# Patient Record
Sex: MALE | Race: WHITE | NOT HISPANIC OR LATINO | URBAN - METROPOLITAN AREA
[De-identification: names, ages, dates, MRNs, and addresses within clinical notes are randomized per-mention and may not be internally consistent; named-entity substitution may affect disease eponyms.]

---

## 2017-01-05 ENCOUNTER — OUTPATIENT (OUTPATIENT)
Dept: OUTPATIENT SERVICES | Facility: HOSPITAL | Age: 57
LOS: 1 days | Discharge: HOME | End: 2017-01-05

## 2017-01-20 ENCOUNTER — RECORD ABSTRACTING (OUTPATIENT)
Age: 57
End: 2017-01-20

## 2017-01-24 ENCOUNTER — RX RENEWAL (OUTPATIENT)
Age: 57
End: 2017-01-24

## 2017-02-24 ENCOUNTER — APPOINTMENT (OUTPATIENT)
Dept: CARDIOLOGY | Facility: CLINIC | Age: 57
End: 2017-02-24

## 2017-02-24 VITALS
DIASTOLIC BLOOD PRESSURE: 80 MMHG | WEIGHT: 300 LBS | SYSTOLIC BLOOD PRESSURE: 122 MMHG | HEIGHT: 67 IN | HEART RATE: 72 BPM | BODY MASS INDEX: 47.09 KG/M2

## 2017-04-05 ENCOUNTER — APPOINTMENT (OUTPATIENT)
Dept: CARDIOLOGY | Facility: CLINIC | Age: 57
End: 2017-04-05

## 2017-06-16 ENCOUNTER — APPOINTMENT (OUTPATIENT)
Dept: CARDIOLOGY | Facility: CLINIC | Age: 57
End: 2017-06-16

## 2017-06-16 VITALS
BODY MASS INDEX: 47.09 KG/M2 | HEART RATE: 58 BPM | DIASTOLIC BLOOD PRESSURE: 84 MMHG | WEIGHT: 300 LBS | SYSTOLIC BLOOD PRESSURE: 138 MMHG | HEIGHT: 67 IN

## 2017-06-27 DIAGNOSIS — R91.8 OTHER NONSPECIFIC ABNORMAL FINDING OF LUNG FIELD: ICD-10-CM

## 2017-09-25 ENCOUNTER — MEDICATION RENEWAL (OUTPATIENT)
Age: 57
End: 2017-09-25

## 2017-10-11 ENCOUNTER — APPOINTMENT (OUTPATIENT)
Dept: CARDIOLOGY | Facility: CLINIC | Age: 57
End: 2017-10-11

## 2017-10-11 VITALS
WEIGHT: 294 LBS | HEIGHT: 67 IN | DIASTOLIC BLOOD PRESSURE: 84 MMHG | HEART RATE: 68 BPM | SYSTOLIC BLOOD PRESSURE: 142 MMHG | BODY MASS INDEX: 46.15 KG/M2

## 2018-01-19 ENCOUNTER — APPOINTMENT (OUTPATIENT)
Dept: CARDIOLOGY | Facility: CLINIC | Age: 58
End: 2018-01-19

## 2018-02-07 ENCOUNTER — OUTPATIENT (OUTPATIENT)
Dept: OUTPATIENT SERVICES | Facility: HOSPITAL | Age: 58
LOS: 1 days | Discharge: HOME | End: 2018-02-07

## 2018-02-07 DIAGNOSIS — R91.8 OTHER NONSPECIFIC ABNORMAL FINDING OF LUNG FIELD: ICD-10-CM

## 2018-02-09 ENCOUNTER — APPOINTMENT (OUTPATIENT)
Dept: CARDIOLOGY | Facility: CLINIC | Age: 58
End: 2018-02-09

## 2018-02-09 VITALS
HEART RATE: 70 BPM | SYSTOLIC BLOOD PRESSURE: 142 MMHG | BODY MASS INDEX: 45.52 KG/M2 | HEIGHT: 67 IN | WEIGHT: 290 LBS | DIASTOLIC BLOOD PRESSURE: 84 MMHG

## 2018-06-25 ENCOUNTER — APPOINTMENT (OUTPATIENT)
Dept: CARDIOLOGY | Facility: CLINIC | Age: 58
End: 2018-06-25

## 2018-06-25 VITALS
HEIGHT: 67 IN | WEIGHT: 303 LBS | DIASTOLIC BLOOD PRESSURE: 66 MMHG | SYSTOLIC BLOOD PRESSURE: 114 MMHG | BODY MASS INDEX: 47.56 KG/M2 | HEART RATE: 57 BPM

## 2018-09-06 ENCOUNTER — RX RENEWAL (OUTPATIENT)
Age: 58
End: 2018-09-06

## 2018-09-07 ENCOUNTER — OUTPATIENT (OUTPATIENT)
Dept: OUTPATIENT SERVICES | Facility: HOSPITAL | Age: 58
LOS: 1 days | Discharge: HOME | End: 2018-09-07

## 2018-09-07 DIAGNOSIS — R51 HEADACHE: ICD-10-CM

## 2018-09-26 ENCOUNTER — OUTPATIENT (OUTPATIENT)
Dept: OUTPATIENT SERVICES | Facility: HOSPITAL | Age: 58
LOS: 1 days | Discharge: HOME | End: 2018-09-26

## 2018-09-26 DIAGNOSIS — M54.5 LOW BACK PAIN: ICD-10-CM

## 2018-10-05 ENCOUNTER — OUTPATIENT (OUTPATIENT)
Dept: OUTPATIENT SERVICES | Facility: HOSPITAL | Age: 58
LOS: 1 days | Discharge: HOME | End: 2018-10-05

## 2018-10-05 DIAGNOSIS — R91.8 OTHER NONSPECIFIC ABNORMAL FINDING OF LUNG FIELD: ICD-10-CM

## 2018-10-24 ENCOUNTER — APPOINTMENT (OUTPATIENT)
Dept: CARDIOLOGY | Facility: CLINIC | Age: 58
End: 2018-10-24

## 2018-10-24 VITALS
SYSTOLIC BLOOD PRESSURE: 120 MMHG | HEART RATE: 72 BPM | BODY MASS INDEX: 49.44 KG/M2 | HEIGHT: 67 IN | DIASTOLIC BLOOD PRESSURE: 62 MMHG | WEIGHT: 315 LBS

## 2018-11-17 ENCOUNTER — APPOINTMENT (OUTPATIENT)
Dept: CARDIOLOGY | Facility: CLINIC | Age: 58
End: 2018-11-17

## 2019-02-27 ENCOUNTER — APPOINTMENT (OUTPATIENT)
Dept: CARDIOLOGY | Facility: CLINIC | Age: 59
End: 2019-02-27

## 2019-02-27 VITALS
SYSTOLIC BLOOD PRESSURE: 142 MMHG | WEIGHT: 298 LBS | DIASTOLIC BLOOD PRESSURE: 82 MMHG | HEART RATE: 59 BPM | BODY MASS INDEX: 46.77 KG/M2 | HEIGHT: 67 IN

## 2019-02-27 VITALS — SYSTOLIC BLOOD PRESSURE: 110 MMHG | DIASTOLIC BLOOD PRESSURE: 72 MMHG

## 2019-02-27 NOTE — ASSESSMENT
[FreeTextEntry1] : 58 yo male with pmhx and presentation as above \par cad, ccs class 2\par chf, nyha class 2\par 10/18 - nl lv fnx\par repeat labs\par weight reduction and smoking cessation addressed\par long term risks addressed\par diet and act as tolerated\par aggressive risk modif\par rtc 4 months

## 2019-02-27 NOTE — PHYSICAL EXAM
[General Appearance - Well Developed] : well developed [Normal Appearance] : normal appearance [Well Groomed] : well groomed [General Appearance - Well Nourished] : well nourished [No Deformities] : no deformities [General Appearance - In No Acute Distress] : no acute distress [Normal Oral Mucosa] : normal oral mucosa [Normal Jugular Venous A Waves Present] : normal jugular venous A waves present [Normal Jugular Venous V Waves Present] : normal jugular venous V waves present [No Jugular Venous Cyr A Waves] : no jugular venous cyr A waves [Respiration, Rhythm And Depth] : normal respiratory rhythm and effort [Exaggerated Use Of Accessory Muscles For Inspiration] : no accessory muscle use [Auscultation Breath Sounds / Voice Sounds] : lungs were clear to auscultation bilaterally [Heart Rate And Rhythm] : heart rate and rhythm were normal [Heart Sounds] : normal S1 and S2 [Murmurs] : no murmurs present [Abdomen Soft] : soft [Abdomen Tenderness] : non-tender [Abdomen Mass (___ Cm)] : no abdominal mass palpated [Nail Clubbing] : no clubbing of the fingernails [Cyanosis, Localized] : no localized cyanosis [Petechial Hemorrhages (___cm)] : no petechial hemorrhages [] : no ischemic changes [Skin Color & Pigmentation] : normal skin color and pigmentation

## 2019-02-27 NOTE — REVIEW OF SYSTEMS
[Recent Weight Gain (___ Lbs)] : recent [unfilled] ~Ulb weight gain [see HPI] : see HPI [Negative] : Endocrine

## 2019-02-27 NOTE — HISTORY OF PRESENT ILLNESS
[FreeTextEntry1] : 60 yo male with pmhx as below is here for a f/up visit\par hx of cm, chf, cad on med rx\par no major cvs issues, occasional reyes \par lost 20 lbs\par complaint with meds and diet\par ros is otherwise as below\par still smokes

## 2019-07-26 ENCOUNTER — APPOINTMENT (OUTPATIENT)
Dept: CARDIOLOGY | Facility: CLINIC | Age: 59
End: 2019-07-26
Payer: MEDICARE

## 2019-07-26 VITALS
WEIGHT: 315 LBS | BODY MASS INDEX: 49.44 KG/M2 | HEART RATE: 56 BPM | SYSTOLIC BLOOD PRESSURE: 128 MMHG | DIASTOLIC BLOOD PRESSURE: 76 MMHG | HEIGHT: 67 IN

## 2019-07-26 PROCEDURE — 99214 OFFICE O/P EST MOD 30 MIN: CPT

## 2019-07-26 PROCEDURE — 93000 ELECTROCARDIOGRAM COMPLETE: CPT

## 2019-07-26 NOTE — PHYSICAL EXAM
[General Appearance - Well Developed] : well developed [Normal Appearance] : normal appearance [Well Groomed] : well groomed [General Appearance - Well Nourished] : well nourished [No Deformities] : no deformities [General Appearance - In No Acute Distress] : no acute distress [Normal Oral Mucosa] : normal oral mucosa [Normal Jugular Venous V Waves Present] : normal jugular venous V waves present [Normal Jugular Venous A Waves Present] : normal jugular venous A waves present [No Jugular Venous Cyr A Waves] : no jugular venous cyr A waves [Respiration, Rhythm And Depth] : normal respiratory rhythm and effort [Exaggerated Use Of Accessory Muscles For Inspiration] : no accessory muscle use [Heart Rate And Rhythm] : heart rate and rhythm were normal [Auscultation Breath Sounds / Voice Sounds] : lungs were clear to auscultation bilaterally [Heart Sounds] : normal S1 and S2 [Murmurs] : no murmurs present [Abdomen Soft] : soft [Abdomen Tenderness] : non-tender [Abdomen Mass (___ Cm)] : no abdominal mass palpated [Nail Clubbing] : no clubbing of the fingernails [Cyanosis, Localized] : no localized cyanosis [Petechial Hemorrhages (___cm)] : no petechial hemorrhages [] : no ischemic changes [Skin Color & Pigmentation] : normal skin color and pigmentation

## 2019-07-26 NOTE — HISTORY OF PRESENT ILLNESS
[FreeTextEntry1] : 58 yo male with pmhx as below is here for a f/up visit\par hx of cm, chf, cad on med rx\par no major cvs issues, ongoing reyes \par gained 30 lbs\par complaint with meds, not diet\par ros is otherwise as below\par still smokes

## 2019-07-26 NOTE — ASSESSMENT
[FreeTextEntry1] : 58 yo male with pmhx and presentation as above \par cad, ccs class 2\par chf, nyha class 2\par 10/18 - nl lv fnx\par repeat labs reviewed, cont with med rx\par weight reduction and smoking cessation addressed\par long term risks addressed\par diet and act as tolerated\par aggressive risk modif\par rtc 4 months

## 2019-10-18 ENCOUNTER — OUTPATIENT (OUTPATIENT)
Dept: OUTPATIENT SERVICES | Facility: HOSPITAL | Age: 59
LOS: 1 days | Discharge: HOME | End: 2019-10-18
Payer: MEDICARE

## 2019-10-18 DIAGNOSIS — M54.5 LOW BACK PAIN: ICD-10-CM

## 2019-10-18 PROCEDURE — 72148 MRI LUMBAR SPINE W/O DYE: CPT | Mod: 26

## 2019-11-08 ENCOUNTER — APPOINTMENT (OUTPATIENT)
Dept: CARDIOLOGY | Facility: CLINIC | Age: 59
End: 2019-11-08
Payer: MEDICARE

## 2019-11-08 VITALS
DIASTOLIC BLOOD PRESSURE: 76 MMHG | HEART RATE: 67 BPM | HEIGHT: 67 IN | BODY MASS INDEX: 49.44 KG/M2 | WEIGHT: 315 LBS | SYSTOLIC BLOOD PRESSURE: 132 MMHG

## 2019-11-08 PROCEDURE — 93000 ELECTROCARDIOGRAM COMPLETE: CPT

## 2019-11-08 PROCEDURE — 99214 OFFICE O/P EST MOD 30 MIN: CPT

## 2019-11-08 NOTE — HISTORY OF PRESENT ILLNESS
[FreeTextEntry1] : 58 yo male with pmhx as below is here for a f/up visit\par hx of cm, chf, cad on med rx\par no major cvs issues, ongoing reyes \par cont to gain weight\par complaint with meds, not diet\par ros is otherwise as below\par still smokes

## 2019-11-08 NOTE — ASSESSMENT
[FreeTextEntry1] : 60 yo male with pmhx and presentation as above \par cad, ccs class 2\par chf, nyha class 2\par 10/18 - nl lv fnx\par repeat labs reviewed, cont with med rx\par a1c noted, needs to cut down carbs\par weight reduction and smoking cessation addressed\par long term risks addressed\par diet and act as tolerated\par aggressive risk modif\par rtc 4 months

## 2020-03-11 ENCOUNTER — APPOINTMENT (OUTPATIENT)
Dept: CARDIOLOGY | Facility: CLINIC | Age: 60
End: 2020-03-11
Payer: MEDICARE

## 2020-03-11 VITALS — DIASTOLIC BLOOD PRESSURE: 80 MMHG | BODY MASS INDEX: 47.61 KG/M2 | SYSTOLIC BLOOD PRESSURE: 131 MMHG | WEIGHT: 304 LBS

## 2020-03-11 PROCEDURE — 99214 OFFICE O/P EST MOD 30 MIN: CPT

## 2020-03-11 PROCEDURE — 93000 ELECTROCARDIOGRAM COMPLETE: CPT

## 2020-03-11 NOTE — ASSESSMENT
[FreeTextEntry1] : 61 yo male with pmhx and presentation as above \par cad, ccs class 2\par chf, nyha class 2\par 10/18 - nl lv fnx, repeat echo before the next visit\par repeat labs reviewed, cont with med rx\par a1c noted, better\par weight reduction and smoking cessation addressed\par long term risks addressed\par diet and act as tolerated\par aggressive risk modif\par rtc 4 months

## 2020-03-11 NOTE — PHYSICAL EXAM
[General Appearance - Well Developed] : well developed [Normal Appearance] : normal appearance [Well Groomed] : well groomed [General Appearance - Well Nourished] : well nourished [No Deformities] : no deformities [General Appearance - In No Acute Distress] : no acute distress [Normal Oral Mucosa] : normal oral mucosa [Normal Jugular Venous A Waves Present] : normal jugular venous A waves present [Normal Jugular Venous V Waves Present] : normal jugular venous V waves present [No Jugular Venous Cyr A Waves] : no jugular venous cyr A waves [Respiration, Rhythm And Depth] : normal respiratory rhythm and effort [Exaggerated Use Of Accessory Muscles For Inspiration] : no accessory muscle use [Auscultation Breath Sounds / Voice Sounds] : lungs were clear to auscultation bilaterally [Heart Rate And Rhythm] : heart rate and rhythm were normal [Heart Sounds] : normal S1 and S2 [Murmurs] : no murmurs present [Abdomen Soft] : soft [Abdomen Tenderness] : non-tender [Abdomen Mass (___ Cm)] : no abdominal mass palpated [Nail Clubbing] : no clubbing of the fingernails [Cyanosis, Localized] : no localized cyanosis [Petechial Hemorrhages (___cm)] : no petechial hemorrhages [] : no ischemic changes [Skin Color & Pigmentation] : normal skin color and pigmentation [Oriented To Time, Place, And Person] : oriented to person, place, and time

## 2020-03-11 NOTE — HISTORY OF PRESENT ILLNESS
[FreeTextEntry1] : 61 yo male with pmhx as below is here for a f/up visit\par hx of cm, chf, cad on med rx\par no major cvs issues, ongoing reyes \par lost 30 lbs\par complaint with meds and diet\par ros is otherwise as below\par still smokes

## 2020-07-01 ENCOUNTER — APPOINTMENT (OUTPATIENT)
Dept: CARDIOLOGY | Facility: CLINIC | Age: 60
End: 2020-07-01

## 2020-07-15 ENCOUNTER — APPOINTMENT (OUTPATIENT)
Dept: CARDIOLOGY | Facility: CLINIC | Age: 60
End: 2020-07-15

## 2020-12-19 ENCOUNTER — EMERGENCY (EMERGENCY)
Facility: HOSPITAL | Age: 60
LOS: 0 days | Discharge: LEFT BEFORE DISCHARGE | End: 2020-12-19
Attending: EMERGENCY MEDICINE | Admitting: EMERGENCY MEDICINE
Payer: MEDICARE

## 2020-12-19 VITALS
HEART RATE: 88 BPM | DIASTOLIC BLOOD PRESSURE: 70 MMHG | OXYGEN SATURATION: 99 % | TEMPERATURE: 98 F | WEIGHT: 300.05 LBS | HEIGHT: 68 IN | SYSTOLIC BLOOD PRESSURE: 131 MMHG | RESPIRATION RATE: 18 BRPM

## 2020-12-19 VITALS
RESPIRATION RATE: 18 BRPM | DIASTOLIC BLOOD PRESSURE: 63 MMHG | OXYGEN SATURATION: 99 % | SYSTOLIC BLOOD PRESSURE: 164 MMHG | HEART RATE: 64 BPM

## 2020-12-19 DIAGNOSIS — F03.90 UNSPECIFIED DEMENTIA, UNSPECIFIED SEVERITY, WITHOUT BEHAVIORAL DISTURBANCE, PSYCHOTIC DISTURBANCE, MOOD DISTURBANCE, AND ANXIETY: ICD-10-CM

## 2020-12-19 DIAGNOSIS — R55 SYNCOPE AND COLLAPSE: ICD-10-CM

## 2020-12-19 DIAGNOSIS — F17.210 NICOTINE DEPENDENCE, CIGARETTES, UNCOMPLICATED: ICD-10-CM

## 2020-12-19 DIAGNOSIS — E66.9 OBESITY, UNSPECIFIED: ICD-10-CM

## 2020-12-19 DIAGNOSIS — Z20.828 CONTACT WITH AND (SUSPECTED) EXPOSURE TO OTHER VIRAL COMMUNICABLE DISEASES: ICD-10-CM

## 2020-12-19 DIAGNOSIS — R41.82 ALTERED MENTAL STATUS, UNSPECIFIED: ICD-10-CM

## 2020-12-19 DIAGNOSIS — F43.9 REACTION TO SEVERE STRESS, UNSPECIFIED: ICD-10-CM

## 2020-12-19 LAB
ALBUMIN SERPL ELPH-MCNC: 4.5 G/DL — SIGNIFICANT CHANGE UP (ref 3.5–5.2)
ALP SERPL-CCNC: 73 U/L — SIGNIFICANT CHANGE UP (ref 30–115)
ALT FLD-CCNC: 24 U/L — SIGNIFICANT CHANGE UP (ref 0–41)
ANION GAP SERPL CALC-SCNC: 10 MMOL/L — SIGNIFICANT CHANGE UP (ref 7–14)
APTT BLD: 37.7 SEC — SIGNIFICANT CHANGE UP (ref 27–39.2)
AST SERPL-CCNC: 35 U/L — SIGNIFICANT CHANGE UP (ref 0–41)
BASOPHILS # BLD AUTO: 0.05 K/UL — SIGNIFICANT CHANGE UP (ref 0–0.2)
BASOPHILS NFR BLD AUTO: 0.5 % — SIGNIFICANT CHANGE UP (ref 0–1)
BILIRUB SERPL-MCNC: 0.3 MG/DL — SIGNIFICANT CHANGE UP (ref 0.2–1.2)
BUN SERPL-MCNC: 16 MG/DL — SIGNIFICANT CHANGE UP (ref 10–20)
CALCIUM SERPL-MCNC: 9.3 MG/DL — SIGNIFICANT CHANGE UP (ref 8.5–10.1)
CHLORIDE SERPL-SCNC: 104 MMOL/L — SIGNIFICANT CHANGE UP (ref 98–110)
CO2 SERPL-SCNC: 24 MMOL/L — SIGNIFICANT CHANGE UP (ref 17–32)
CREAT SERPL-MCNC: 1.1 MG/DL — SIGNIFICANT CHANGE UP (ref 0.7–1.5)
EOSINOPHIL # BLD AUTO: 0.11 K/UL — SIGNIFICANT CHANGE UP (ref 0–0.7)
EOSINOPHIL NFR BLD AUTO: 1.1 % — SIGNIFICANT CHANGE UP (ref 0–8)
GLUCOSE SERPL-MCNC: 97 MG/DL — SIGNIFICANT CHANGE UP (ref 70–99)
HCT VFR BLD CALC: 47.6 % — SIGNIFICANT CHANGE UP (ref 42–52)
HGB BLD-MCNC: 15.4 G/DL — SIGNIFICANT CHANGE UP (ref 14–18)
IMM GRANULOCYTES NFR BLD AUTO: 0.4 % — HIGH (ref 0.1–0.3)
INR BLD: 1.03 RATIO — SIGNIFICANT CHANGE UP (ref 0.65–1.3)
LYMPHOCYTES # BLD AUTO: 3.36 K/UL — SIGNIFICANT CHANGE UP (ref 1.2–3.4)
LYMPHOCYTES # BLD AUTO: 33.3 % — SIGNIFICANT CHANGE UP (ref 20.5–51.1)
MCHC RBC-ENTMCNC: 31.1 PG — HIGH (ref 27–31)
MCHC RBC-ENTMCNC: 32.4 G/DL — SIGNIFICANT CHANGE UP (ref 32–37)
MCV RBC AUTO: 96.2 FL — HIGH (ref 80–94)
MONOCYTES # BLD AUTO: 0.6 K/UL — SIGNIFICANT CHANGE UP (ref 0.1–0.6)
MONOCYTES NFR BLD AUTO: 6 % — SIGNIFICANT CHANGE UP (ref 1.7–9.3)
NEUTROPHILS # BLD AUTO: 5.92 K/UL — SIGNIFICANT CHANGE UP (ref 1.4–6.5)
NEUTROPHILS NFR BLD AUTO: 58.7 % — SIGNIFICANT CHANGE UP (ref 42.2–75.2)
NRBC # BLD: 0 /100 WBCS — SIGNIFICANT CHANGE UP (ref 0–0)
PLATELET # BLD AUTO: 220 K/UL — SIGNIFICANT CHANGE UP (ref 130–400)
POTASSIUM SERPL-MCNC: 4.6 MMOL/L — SIGNIFICANT CHANGE UP (ref 3.5–5)
POTASSIUM SERPL-SCNC: 4.6 MMOL/L — SIGNIFICANT CHANGE UP (ref 3.5–5)
PROT SERPL-MCNC: 6.9 G/DL — SIGNIFICANT CHANGE UP (ref 6–8)
PROTHROM AB SERPL-ACNC: 11.8 SEC — SIGNIFICANT CHANGE UP (ref 9.95–12.87)
RAPID RVP RESULT: SIGNIFICANT CHANGE UP
RBC # BLD: 4.95 M/UL — SIGNIFICANT CHANGE UP (ref 4.7–6.1)
RBC # FLD: 14.5 % — SIGNIFICANT CHANGE UP (ref 11.5–14.5)
SARS-COV-2 RNA SPEC QL NAA+PROBE: SIGNIFICANT CHANGE UP
SODIUM SERPL-SCNC: 138 MMOL/L — SIGNIFICANT CHANGE UP (ref 135–146)
TROPONIN T SERPL-MCNC: <0.01 NG/ML — SIGNIFICANT CHANGE UP
WBC # BLD: 10.08 K/UL — SIGNIFICANT CHANGE UP (ref 4.8–10.8)
WBC # FLD AUTO: 10.08 K/UL — SIGNIFICANT CHANGE UP (ref 4.8–10.8)

## 2020-12-19 PROCEDURE — 70498 CT ANGIOGRAPHY NECK: CPT | Mod: 26

## 2020-12-19 PROCEDURE — 70450 CT HEAD/BRAIN W/O DYE: CPT | Mod: 26,59

## 2020-12-19 PROCEDURE — 70496 CT ANGIOGRAPHY HEAD: CPT | Mod: 26

## 2020-12-19 PROCEDURE — 99285 EMERGENCY DEPT VISIT HI MDM: CPT | Mod: CS

## 2020-12-19 NOTE — ED PROVIDER NOTE - OBJECTIVE STATEMENT
Patient is a 60 years old M pmhx sleep apnea on cpap, obesity presents to the ED to bring mom for evaluation and upon being in triage patient felt weak and was near syncopal and code stroke called.  As per patient brought mom in for placement as she has been progressively worsening with dementia.  Pt sts under great stress due to moving day tomorrow.

## 2020-12-19 NOTE — ED ADULT TRIAGE NOTE - CHIEF COMPLAINT QUOTE
Pt was in the waiting room to drop off his mother. Pt had a period of confusion, pt was not answering questions, not speaking. Incident happened at 16:41

## 2020-12-19 NOTE — ED PROVIDER NOTE - ATTENDING CONTRIBUTION TO CARE
Pt was assisting his elderly mother to the ER. Pt states he is under severe stress, + tob. Pt did not eat today. Only had coffee. While standing in the waiting area suddenly pt became unresponsive while standing. No fall. Pt was leaning on entry area. Nursing called and found pt not responding though standing and leaning. Placed on stretcher. pt then woke up and appeared slightly confused. When I was called in 15 seconds pt was awake alert and oriented. Pt states he is moving and very busy. No headache, no chest pain, no back pain, no weakness or numbness anywhere. Neuro intact. S1S2 rrr, lungs clear,

## 2020-12-19 NOTE — ED PROVIDER NOTE - PATIENT PORTAL LINK FT
You can access the FollowMyHealth Patient Portal offered by [facility] by registering at the following website: [email or web address]. By joining [facility]’s FollowMyHealth portal, you will also be able to view your health information using other applications (apps) compatible with our system.

## 2020-12-19 NOTE — ED PROVIDER NOTE - PROGRESS NOTE DETAILS
Dr. Gray spoke with neurologist Dr. Galdamez The patient wishes to leave against medical advice.  I have discussed the risks, benefits and alternatives (including the possibility of worsening of disease, pain, permanent disability, and/or death) with the patient and his/her family (if available).  The patient voices understanding of these risks, benefits, and alternatives and still wishes to sign out against medical advice.  The patient is awake, alert, oriented  x 3 and has demonstrated capacity to refuse/direct care.  I have advised the patient that they can and should return immediately should they develop any worse/different/additional symptoms, or if they change their mind and want to continue their care.  As per patient is moving tomorrow and cannot stay, patient is aware to return at any time for continued care.  Pt sts he cannot take mom home who is also a patient in the ED as he notes he no longer can take care of patient and she needs to be placed in a home.

## 2020-12-19 NOTE — ED PROVIDER NOTE - CLINICAL SUMMARY MEDICAL DECISION MAKING FREE TEXT BOX
Pt with episode of unresponsiveness while standing. Did not fall. Safely placed on stretcher. Pt without complaints. Stroke code called. NIH 0. Spoke to neuro attending on call-Dr. eSbastian jones>>admission and EEG. Pt refused to stay as he is moving tomorrow.

## 2020-12-19 NOTE — ED PROVIDER NOTE - PHYSICAL EXAMINATION
Gen: Alert, NAD  Head: NC, AT, PERRL, EOMI  ENT: normal hearing, patent oropharynx without erythema/exudate, uvula midline  Neck: +supple, no tenderness  Pulm: Bilateral BS, normal resp effort  CV: RRR  Abd: soft, NT/ND  Skin: no rash  Neuro: AAOx3

## 2020-12-28 ENCOUNTER — EMERGENCY (EMERGENCY)
Facility: HOSPITAL | Age: 60
LOS: 0 days | Discharge: HOME | End: 2020-12-28
Attending: EMERGENCY MEDICINE | Admitting: EMERGENCY MEDICINE
Payer: COMMERCIAL

## 2020-12-28 VITALS
OXYGEN SATURATION: 99 % | HEART RATE: 108 BPM | SYSTOLIC BLOOD PRESSURE: 190 MMHG | RESPIRATION RATE: 18 BRPM | HEIGHT: 68 IN | WEIGHT: 315 LBS | TEMPERATURE: 98 F | DIASTOLIC BLOOD PRESSURE: 107 MMHG
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
## 2020-12-28 DIAGNOSIS — Z20.828 CONTACT WITH AND (SUSPECTED) EXPOSURE TO OTHER VIRAL COMMUNICABLE DISEASES: ICD-10-CM
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
## 2020-12-28 DIAGNOSIS — R55 SYNCOPE AND COLLAPSE: ICD-10-CM

## 2020-12-28 DIAGNOSIS — F17.210 NICOTINE DEPENDENCE, CIGARETTES, UNCOMPLICATED: ICD-10-CM

## 2020-12-28 LAB
ALBUMIN SERPL ELPH-MCNC: 4.4 G/DL — SIGNIFICANT CHANGE UP (ref 3.5–5.2)
ALP SERPL-CCNC: 69 U/L — SIGNIFICANT CHANGE UP (ref 30–115)
ALT FLD-CCNC: 38 U/L — SIGNIFICANT CHANGE UP (ref 0–41)
ANION GAP SERPL CALC-SCNC: 10 MMOL/L — SIGNIFICANT CHANGE UP (ref 7–14)
AST SERPL-CCNC: 31 U/L — SIGNIFICANT CHANGE UP (ref 0–41)
BASOPHILS # BLD AUTO: 0.06 K/UL — SIGNIFICANT CHANGE UP (ref 0–0.2)
BASOPHILS NFR BLD AUTO: 0.6 % — SIGNIFICANT CHANGE UP (ref 0–1)
BILIRUB SERPL-MCNC: 0.3 MG/DL — SIGNIFICANT CHANGE UP (ref 0.2–1.2)
BUN SERPL-MCNC: 15 MG/DL — SIGNIFICANT CHANGE UP (ref 10–20)
CALCIUM SERPL-MCNC: 9.3 MG/DL — SIGNIFICANT CHANGE UP (ref 8.5–10.1)
CHLORIDE SERPL-SCNC: 104 MMOL/L — SIGNIFICANT CHANGE UP (ref 98–110)
CO2 SERPL-SCNC: 25 MMOL/L — SIGNIFICANT CHANGE UP (ref 17–32)
CREAT SERPL-MCNC: 1 MG/DL — SIGNIFICANT CHANGE UP (ref 0.7–1.5)
EOSINOPHIL # BLD AUTO: 0.04 K/UL — SIGNIFICANT CHANGE UP (ref 0–0.7)
EOSINOPHIL NFR BLD AUTO: 0.4 % — SIGNIFICANT CHANGE UP (ref 0–8)
GLUCOSE SERPL-MCNC: 114 MG/DL — HIGH (ref 70–99)
HCT VFR BLD CALC: 47.5 % — SIGNIFICANT CHANGE UP (ref 42–52)
HGB BLD-MCNC: 15.3 G/DL — SIGNIFICANT CHANGE UP (ref 14–18)
IMM GRANULOCYTES NFR BLD AUTO: 0.7 % — HIGH (ref 0.1–0.3)
LYMPHOCYTES # BLD AUTO: 1.68 K/UL — SIGNIFICANT CHANGE UP (ref 1.2–3.4)
LYMPHOCYTES # BLD AUTO: 15.9 % — LOW (ref 20.5–51.1)
MAGNESIUM SERPL-MCNC: 2.1 MG/DL — SIGNIFICANT CHANGE UP (ref 1.8–2.4)
MCHC RBC-ENTMCNC: 31.4 PG — HIGH (ref 27–31)
MCHC RBC-ENTMCNC: 32.2 G/DL — SIGNIFICANT CHANGE UP (ref 32–37)
MCV RBC AUTO: 97.3 FL — HIGH (ref 80–94)
MONOCYTES # BLD AUTO: 0.5 K/UL — SIGNIFICANT CHANGE UP (ref 0.1–0.6)
MONOCYTES NFR BLD AUTO: 4.7 % — SIGNIFICANT CHANGE UP (ref 1.7–9.3)
NEUTROPHILS # BLD AUTO: 8.22 K/UL — HIGH (ref 1.4–6.5)
NEUTROPHILS NFR BLD AUTO: 77.7 % — HIGH (ref 42.2–75.2)
NRBC # BLD: 0 /100 WBCS — SIGNIFICANT CHANGE UP (ref 0–0)
NT-PROBNP SERPL-SCNC: 43 PG/ML — SIGNIFICANT CHANGE UP (ref 0–300)
PLATELET # BLD AUTO: 217 K/UL — SIGNIFICANT CHANGE UP (ref 130–400)
POTASSIUM SERPL-MCNC: 4.5 MMOL/L — SIGNIFICANT CHANGE UP (ref 3.5–5)
POTASSIUM SERPL-SCNC: 4.5 MMOL/L — SIGNIFICANT CHANGE UP (ref 3.5–5)
PROT SERPL-MCNC: 6.6 G/DL — SIGNIFICANT CHANGE UP (ref 6–8)
RBC # BLD: 4.88 M/UL — SIGNIFICANT CHANGE UP (ref 4.7–6.1)
RBC # FLD: 14.5 % — SIGNIFICANT CHANGE UP (ref 11.5–14.5)
SARS-COV-2 RNA SPEC QL NAA+PROBE: SIGNIFICANT CHANGE UP
SODIUM SERPL-SCNC: 139 MMOL/L — SIGNIFICANT CHANGE UP (ref 135–146)
TROPONIN T SERPL-MCNC: <0.01 NG/ML — SIGNIFICANT CHANGE UP
WBC # BLD: 10.57 K/UL — SIGNIFICANT CHANGE UP (ref 4.8–10.8)
WBC # FLD AUTO: 10.57 K/UL — SIGNIFICANT CHANGE UP (ref 4.8–10.8)

## 2020-12-28 PROCEDURE — 99285 EMERGENCY DEPT VISIT HI MDM: CPT

## 2020-12-28 PROCEDURE — 71045 X-RAY EXAM CHEST 1 VIEW: CPT | Mod: 26

## 2020-12-28 PROCEDURE — 93010 ELECTROCARDIOGRAM REPORT: CPT

## 2020-12-28 RX ORDER — SODIUM CHLORIDE 9 MG/ML
1000 INJECTION, SOLUTION INTRAVENOUS ONCE
Refills: 0 | Status: COMPLETED | OUTPATIENT
Start: 2020-12-28 | End: 2020-12-28

## 2020-12-28 RX ADMIN — SODIUM CHLORIDE 1000 MILLILITER(S): 9 INJECTION, SOLUTION INTRAVENOUS at 20:18
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
## 2020-12-28 NOTE — ED PROVIDER NOTE - CARE PROVIDERS DIRECT ADDRESSES
[email or web address]

## 2020-12-28 NOTE — ED PROVIDER NOTE - PATIENT PORTAL LINK FT
You can access the FollowMyHealth Patient Portal offered by [facility] by registering at the following website: [email or web address]. By joining [facility]’s FollowMyHealth portal, you will also be able to view your health information using other applications (apps) compatible with our system.

## 2020-12-28 NOTE — ED PROVIDER NOTE - PHYSICAL EXAMINATION
CONSTITUTIONAL: Well-developed; well-nourished; in no acute distress. Non toxic appearing,   SKIN: warm, dry, no acute rash, abrasions, lacerations or hematomas.  HEAD: Normocephalic; no skull indentations, no contusions or lacerations. no battles sign or raccoon eyes.   EENT:no gross trauma bilaterally, no proptosis conjunctiva and sclera clear. No entrapment.  MM moist, no nasal discharge.  No septal hematoma. Dentition intact. Pharynx unremarkable. TM's unremarkable, no bulging, normal light reflex, no hemotympanum.   NECK: Supple, no midline tenderness, normal ROM, no midline ttp or stepoffs  BACK: nttp no midline ttp or stepoffs.  CHEST: No bruising, sub cutaneous emphysema, or crepitus, nttp.  CARD: S1, S2 normal; no murmurs, gallops, or rubs. Regular rate and rhythm.   RESP: No wheezes, rales or rhonchi.  ABD: soft ntnd no seatbelt sign  BACK: no midline tenderness or step offs  PELVIS: no laxity with lateral compression  EXT: no gross extremity injury  NEURO: gcs 15, moving all extremities grossly, following commands  PSYCH: Cooperative, appropriate
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
## 2020-12-28 NOTE — ED ADULT TRIAGE NOTE - CADM TRG TX PRIOR TO ARRIVAL
Pt and mother updated on plan of care. Pt states that she would to go home. Mother states that she had Motrin at 3:15 pm and Tylenol at noon.      Celso Pa RN  02/22/20 5986 [facility]  02/22/20 5412 collar cleared md aguero/KATHY-pa

## 2020-12-28 NOTE — ED ADULT NURSE NOTE - EXPLANATION OF PATIENT'S REASON FOR LEAVING
Pt. states that he care for mother with dementia at home. He states that he will be following up with cardiology tomorrow
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
## 2020-12-28 NOTE — ED PROVIDER NOTE - NS ED ROS FT

## 2020-12-28 NOTE — ED ADULT NURSE NOTE - NS ED NURSE RECORD ANOTHER VITAL SIGN
Anesthesia Evaluation     no history of anesthetic complications:  NPO Solid Status: > 8 hours  NPO Liquid Status: > 8 hours           Airway   Mallampati: II  TM distance: >3 FB  Neck ROM: full  Dental    (+) lower dentures and upper dentures    Pulmonary     breath sounds clear to auscultation  (+) a smoker (1 PPD) Current Smoked day of surgery, COPD, decreased breath sounds,     ROS comment: cough  Cardiovascular   Exercise tolerance: good (4-7 METS)    Rhythm: regular  Rate: normal    (+) hypertension, murmur, PVD, hyperlipidemia  (-) past MI, angina    ROS comment: EF 50-55%    Neuro/Psych- negative ROS  GI/Hepatic/Renal/Endo - negative ROS     Musculoskeletal     (+) back pain,   Abdominal  - normal exam   Substance History - negative use     OB/GYN          Other   (+) arthritis     (-) history of cancer                Anesthesia Plan    ASA 3     MAC     intravenous induction   Anesthetic plan and risks discussed with patient.       Yes

## 2020-12-28 NOTE — ED PROVIDER NOTE - CARE PROVIDER_API CALL
Clay Aldridge  CARDIOVASCULAR DISEASE  [address] [place] [address], [place] [address]  Phone: [phone]  Fax: [phone]  Follow Up Time: 4-6 Days

## 2021-01-05 ENCOUNTER — APPOINTMENT (OUTPATIENT)
Dept: CARDIOLOGY | Facility: CLINIC | Age: 61
End: 2021-01-05
Payer: MEDICARE

## 2021-01-05 VITALS
SYSTOLIC BLOOD PRESSURE: 130 MMHG | TEMPERATURE: 97.3 F | DIASTOLIC BLOOD PRESSURE: 80 MMHG | BODY MASS INDEX: 47.09 KG/M2 | RESPIRATION RATE: 18 BRPM | OXYGEN SATURATION: 98 % | HEART RATE: 60 BPM | HEIGHT: 67 IN | WEIGHT: 300 LBS

## 2021-01-05 PROCEDURE — 93000 ELECTROCARDIOGRAM COMPLETE: CPT

## 2021-01-05 PROCEDURE — 99214 OFFICE O/P EST MOD 30 MIN: CPT | Mod: 25

## 2021-01-05 PROCEDURE — 93306 TTE W/DOPPLER COMPLETE: CPT

## 2021-01-05 NOTE — ASSESSMENT
[FreeTextEntry1] : 61 yo male with pmhx and presentation as above \par cad, ccs class 2\par chf, nyha class 2\par recurrent syncope\par 2d ehco today\par ep referral given\par refrain from driving until w/up completed\par ample hydration, stress coping mechanism discussed\par weight reduction and smoking cessation addressed\par long term risks addressed\par diet and act as tolerated\par aggressive risk modif\par rtc 4-6 weeks
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
## 2021-01-12 ENCOUNTER — APPOINTMENT (OUTPATIENT)
Dept: CARDIOLOGY | Facility: CLINIC | Age: 61
End: 2021-01-12
Payer: MEDICARE

## 2021-01-12 VITALS
TEMPERATURE: 97.3 F | BODY MASS INDEX: 47.09 KG/M2 | SYSTOLIC BLOOD PRESSURE: 138 MMHG | DIASTOLIC BLOOD PRESSURE: 88 MMHG | HEIGHT: 67 IN | WEIGHT: 300 LBS | HEART RATE: 61 BPM

## 2021-01-12 PROCEDURE — 99204 OFFICE O/P NEW MOD 45 MIN: CPT

## 2021-01-12 PROCEDURE — 93000 ELECTROCARDIOGRAM COMPLETE: CPT

## 2021-01-12 NOTE — PHYSICAL EXAM
[General Appearance - Well Developed] : well developed [Normal Appearance] : normal appearance [Well Groomed] : well groomed [General Appearance - Well Nourished] : well nourished [No Deformities] : no deformities [General Appearance - In No Acute Distress] : no acute distress [Normal Conjunctiva] : the conjunctiva exhibited no abnormalities [Eyelids - No Xanthelasma] : the eyelids demonstrated no xanthelasmas [Normal Oral Mucosa] : normal oral mucosa [No Oral Pallor] : no oral pallor [No Oral Cyanosis] : no oral cyanosis [Normal Jugular Venous A Waves Present] : normal jugular venous A waves present [Normal Jugular Venous V Waves Present] : normal jugular venous V waves present [No Jugular Venous Cyr A Waves] : no jugular venous cyr A waves [Heart Rate And Rhythm] : heart rate and rhythm were normal [Heart Sounds] : normal S1 and S2 [Murmurs] : no murmurs present [Respiration, Rhythm And Depth] : normal respiratory rhythm and effort [Exaggerated Use Of Accessory Muscles For Inspiration] : no accessory muscle use [Auscultation Breath Sounds / Voice Sounds] : lungs were clear to auscultation bilaterally [Abdomen Soft] : soft [Abdomen Tenderness] : non-tender [Abdomen Mass (___ Cm)] : no abdominal mass palpated [Abnormal Walk] : normal gait [Gait - Sufficient For Exercise Testing] : the gait was sufficient for exercise testing [Nail Clubbing] : no clubbing of the fingernails [Cyanosis, Localized] : no localized cyanosis [Petechial Hemorrhages (___cm)] : no petechial hemorrhages [Skin Color & Pigmentation] : normal skin color and pigmentation [] : no rash [Skin Lesions] : no skin lesions [No Venous Stasis] : no venous stasis [No Skin Ulcers] : no skin ulcer [No Xanthoma] : no  xanthoma was observed [Affect] : the affect was normal [Oriented To Time, Place, And Person] : oriented to person, place, and time [Mood] : the mood was normal [No Anxiety] : not feeling anxious
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
## 2021-01-12 NOTE — DISCUSSION/SUMMARY
[FreeTextEntry1] : Mr. Leandro Haley is a pleasant 60 year-old man with cardiomyopathy, improved EF, BEVERLEY on CPAP, hypertension, hyperlipidemia, and recurrent syncope. \par \par Etiology of syncope unclear; could be related to bradyarrhythmias, sleep apnea, or neurological. I recommend long term monitoring MCOT vs. ILR\par \par I will obtain records from Neurology; patient states he had MRI and EEG with Dr. Montes. I recommend follow-up with Dr. Montes. \par \par I recommend follow-up with sleep specialist to reassess BEVERLEY and CPAP settings.\par \par Since his symptoms do not occur on a daily basis, a Holter monitor is less likely to be helpful in his management. I recommend a 4 weeks event monitor to evaluate for the etiology of his symptoms. I educated the patient on the use of symptoms’ trigger feature of the event monitor. I will reassess patient after completion of the 4 weeks event monitor.\par \par If MCOT is negative, I will proceed with ILR implant\par \par I discussed with him the plan of care in great details. I answered all his questions and he was satisfied with the visit. Patient will follow with me in 4-6 weeks' time. Please do not hesitate to contact me at [phone] if you have any questions regarding his care.

## 2021-02-02 ENCOUNTER — OUTPATIENT (OUTPATIENT)
Dept: OUTPATIENT SERVICES | Facility: HOSPITAL | Age: 61
LOS: 1 days | Discharge: HOME | End: 2021-02-02

## 2021-02-02 DIAGNOSIS — Z11.59 ENCOUNTER FOR SCREENING FOR OTHER VIRAL DISEASES: ICD-10-CM

## 2021-02-05 ENCOUNTER — OUTPATIENT (OUTPATIENT)
Dept: OUTPATIENT SERVICES | Facility: HOSPITAL | Age: 61
LOS: 1 days | Discharge: HOME | End: 2021-02-05

## 2021-02-08 DIAGNOSIS — G47.33 OBSTRUCTIVE SLEEP APNEA (ADULT) (PEDIATRIC): ICD-10-CM

## 2021-03-09 ENCOUNTER — APPOINTMENT (OUTPATIENT)
Dept: PULMONOLOGY | Facility: CLINIC | Age: 61
End: 2021-03-09
Payer: MEDICARE

## 2021-03-09 VITALS
WEIGHT: 315 LBS | HEART RATE: 98 BPM | DIASTOLIC BLOOD PRESSURE: 74 MMHG | HEIGHT: 67 IN | OXYGEN SATURATION: 100 % | RESPIRATION RATE: 14 BRPM | SYSTOLIC BLOOD PRESSURE: 136 MMHG | BODY MASS INDEX: 49.44 KG/M2

## 2021-03-09 PROCEDURE — 99213 OFFICE O/P EST LOW 20 MIN: CPT

## 2021-03-09 NOTE — PHYSICAL EXAM
[No Acute Distress] : no acute distress [IV] : Mallampati Class: IV [Normal Appearance] : normal appearance [No Neck Mass] : no neck mass [Normal Rate/Rhythm] : normal rate/rhythm [Normal S1, S2] : normal s1, s2 [No Murmurs] : no murmurs [No Resp Distress] : no resp distress [Clear to Auscultation Bilaterally] : clear to auscultation bilaterally [No Abnormalities] : no abnormalities [No Clubbing] : no clubbing [No Cyanosis] : no cyanosis [No Edema] : no edema [[id number]] : varicosities

## 2021-03-19 ENCOUNTER — APPOINTMENT (OUTPATIENT)
Dept: CARDIOLOGY | Facility: CLINIC | Age: 61
End: 2021-03-19

## 2021-04-07 ENCOUNTER — NON-APPOINTMENT (OUTPATIENT)
Age: 61
End: 2021-04-07

## 2021-04-26 ENCOUNTER — OUTPATIENT (OUTPATIENT)
Dept: OUTPATIENT SERVICES | Facility: HOSPITAL | Age: 61
LOS: 1 days | Discharge: HOME | End: 2021-04-26

## 2021-04-26 ENCOUNTER — LABORATORY RESULT (OUTPATIENT)
Age: 61
End: 2021-04-26

## 2021-04-26 DIAGNOSIS — Z11.59 ENCOUNTER FOR SCREENING FOR OTHER VIRAL DISEASES: ICD-10-CM

## 2021-04-29 ENCOUNTER — OUTPATIENT (OUTPATIENT)
Dept: OUTPATIENT SERVICES | Facility: HOSPITAL | Age: 61
LOS: 1 days | Discharge: HOME | End: 2021-04-29
Payer: MEDICARE

## 2021-04-29 DIAGNOSIS — R55 SYNCOPE AND COLLAPSE: ICD-10-CM

## 2021-04-29 PROCEDURE — 33285 INSJ SUBQ CAR RHYTHM MNTR: CPT

## 2021-04-29 RX ORDER — CEPHALEXIN 500 MG
500 CAPSULE ORAL ONCE
Refills: 0 | Status: DISCONTINUED | OUTPATIENT
Start: 2021-04-29 | End: 2021-05-13

## 2021-04-29 NOTE — H&P ADULT - NSHPPHYSICALEXAM_GEN_ALL_CORE
General: Obese male, NAD  Pulm: CTA bilaterally.  Cardiac: S1S2. RRR. No murmur, rubs or gallops  GI: Rounded, soft, NT/ND, + BS  PV: Pulses palpable, warm and well perfused. No cyanosis, clubbing or edema  Neuro: Anxious

## 2021-04-29 NOTE — H&P ADULT - HISTORY OF PRESENT ILLNESS
This is a 62 y/o male with PMH HTN, HLD, CM, obesity, BEVERLEY (CPAP), recurrent syncope (recent MCOT that showed no AF,  1% PVC burden, 7 % PAc's,  had 3 beats and 7 beats nsvt; lowest HR 34 BPM during wake hours), who presents for implantable loop recorder for recurrent syncope.Currently denies dizziness, chest pain, SOB, cough, palpitations, abd pain, n/v/d/c, dysuria or hematuria.

## 2021-04-29 NOTE — CHART NOTE - NSCHARTNOTEFT_GEN_A_CORE
Break RN: A&Ox4. MD Weldon notified of HTN, Lasix ordered and given. Will reassess. COVID results negative, MD aware and bipap to be initiated. EP PROCEDURE NOTE    Date of Procedure: 04-29-21  EP Attending: Dr. Vaz  Assistant: DAVID Gutierrez  Referring Physician: Dr. Aldridge  Procedure: Loop Recorder Implant    Indication: 61y Male with history of recurrent syncope referred for implantable loop recorder.     Anesthesia: Local    EQUIPMENT IMPLANTED  : Medtronic Linq  Model Number: [id number]  Serial Number: [id number]    PROCEDURE DESCRIPTION  The patient was brought to the electrophysiology procedure area in a non-sedated state and received preoperative antibiotics. Informed, written consent was obtained prior to the procedure. The left anterior chest region was prepped and cleaned from the nipple to the upper left clavicular border with serial applications of Chlorhexidine. Patient was then covered with sterile drapes in the usual manner. Blood pressure, oxygenation, and level of comfort were stable throughout.     Following infiltration with local anesthetic, a 1-cm incision was made along the left sternal border at the fourth intercostal space. Using the tunneling device the implantable loop recorder was inserted into the subcutaneous tissue. Direct pressure was applied to the wound to obtain hemostasis. The wound was approximated with Dermabond. Steri-strips and a dry, sterile dressing were placed over the wound. R waves were noted to be 0.21 mV.     The patient tolerated the procedure well.     COMPLICATIONS  No immediate complications    CONCLUSIONS  Successful loop recorder implant    EBL: 2 cc

## 2021-04-29 NOTE — PROGRESS NOTE ADULT - SUBJECTIVE AND OBJECTIVE BOX
Electrophysiology Brief Post-Op Note    I have personally seen and examined the patient.  I agree with the history and physical which I have reviewed and noted any changes below.  04-29-21 @ 09:00    PRE-OP DIAGNOSIS: syncope    POST-OP DIAGNOSIS: syncope    PROCEDURE: Loop Implant    Physician: Dr. Bennett  Assistant: DAVID Gutierrez    ESTIMATED BLOOD LOSS:  2    mL    ANESTHESIA TYPE:  [  ]General Anesthesia  [  ] Sedation  [X  ] Local/Regional    CONDITION  [  ] Critical  [  ] Serious  [  ]Fair  [ X ]Good    SPECIMENS REMOVED (IF APPLICABLE):  none    IMPLANTS (IF APPLICABLE)  Loop Recorder (Medtronic)    FINDINGS  PLAN OF CARE  - F/U 3-4 weeks  - May remove bandaid tomorrow  - May shower in 48 hours

## 2021-04-29 NOTE — H&P ADULT - ASSESSMENT
Cardiologist: Dr. Aldridge    This is a 60 y/o male with PMH HTN, HLD, CM, obesity, BEVERLEY (CPAP), recurrent syncope (recent MCOT that showed no AF,  1% PVC burden, 7 % PAc's,  had 3 beats and 7 beats nsvt; lowest HR 34 BPM during wake hours), who presents for implantable loop recorder for recurrent syncope.    Plan:  - Keflex 500 mg Po x1 for preop ppx  - Home following procedure  - Please f/u in 1 month for a wound   - Remove band aid tomorrow  - May shower starting Saturday  - No submerging in water for 1 week  
Partially impaired: cannot see medication labels or newsprint, but can see obstacles in path, and the surrounding layout; can count fingers at arm's length

## 2021-05-10 DIAGNOSIS — E66.9 OBESITY, UNSPECIFIED: ICD-10-CM

## 2021-05-10 DIAGNOSIS — I10 ESSENTIAL (PRIMARY) HYPERTENSION: ICD-10-CM

## 2021-05-10 DIAGNOSIS — G47.33 OBSTRUCTIVE SLEEP APNEA (ADULT) (PEDIATRIC): ICD-10-CM

## 2021-05-10 DIAGNOSIS — E78.00 PURE HYPERCHOLESTEROLEMIA, UNSPECIFIED: ICD-10-CM

## 2021-05-25 ENCOUNTER — APPOINTMENT (OUTPATIENT)
Dept: CARDIOLOGY | Facility: CLINIC | Age: 61
End: 2021-05-25
Payer: MEDICARE

## 2021-05-25 VITALS
HEIGHT: 67 IN | WEIGHT: 304.9 LBS | HEART RATE: 60 BPM | SYSTOLIC BLOOD PRESSURE: 146 MMHG | TEMPERATURE: 97.9 F | DIASTOLIC BLOOD PRESSURE: 83 MMHG | BODY MASS INDEX: 47.85 KG/M2 | OXYGEN SATURATION: 96 %

## 2021-05-25 PROCEDURE — 99212 OFFICE O/P EST SF 10 MIN: CPT

## 2021-05-25 PROCEDURE — 93000 ELECTROCARDIOGRAM COMPLETE: CPT
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
## 2021-05-25 NOTE — ASSESSMENT
[FreeTextEntry1] : Mr. Leandro Haley is a pleasant 61 year-old man with cardiomyopathy, improved EF, BEVERLEY on CPAP, hypertension, hyperlipidemia, and recurrent syncope. \par \par \par \par 1. Recurrent syncope, s/p ILR 4/28/2021 \par \par -Loop recorder interrogation: normal fx, no events , no arrhythmias \par -Loop recorder incision site healed well, no s/s infection \par -Enrolled in remote monitoring, however his home transmitter has been disconnected . \par -Instructed to reconnect the monitor and send a manual transmission.\par -Continue current medications \par -F/U with cardio/ as scheduled  \par -F/U with neuro Dr. Montes. \par -F/U with pulmonary as scheduled \par -RTC in 4 months \par \par \par  I discussed with him the plan of care in great details. I answered all his questions and he was satisfied with the visit. Please do not hesitate to contact us  at [phone] if you have any questions regarding his care.

## 2021-05-25 NOTE — PROCEDURE
[No] : not [NSR] : normal sinus rhythm [See Scanned Paceart Report] : See scanned paceart report [See Device Printout] : See device printout [Voltage: ___ volts] : Voltage was [unfilled] volts [Longevity: ___ months] : The estimated remaining battery life is [unfilled] months [Threshold Testing Performed] : Threshold testing was performed [None] : none [de-identified] : LINQ  [de-identified] : R [de-identified] : [id number] [de-identified] : 4/29/2021  [de-identified] : NO events

## 2021-05-25 NOTE — PHYSICAL EXAM
[General Appearance - Well Developed] : well developed [Normal Appearance] : normal appearance [Well Groomed] : well groomed [General Appearance - Well Nourished] : well nourished [No Deformities] : no deformities [General Appearance - In No Acute Distress] : no acute distress [Heart Sounds] : normal S1 and S2 [Heart Rate And Rhythm] : heart rate and rhythm were normal [Murmurs] : no murmurs present [Respiration, Rhythm And Depth] : normal respiratory rhythm and effort [Exaggerated Use Of Accessory Muscles For Inspiration] : no accessory muscle use [Auscultation Breath Sounds / Voice Sounds] : lungs were clear to auscultation bilaterally [Abdomen Soft] : soft [Abdomen Tenderness] : non-tender [Abdomen Mass (___ Cm)] : no abdominal mass palpated [Nail Clubbing] : no clubbing of the fingernails [Cyanosis, Localized] : no localized cyanosis [Petechial Hemorrhages (___cm)] : no petechial hemorrhages [Normal Conjunctiva] : the conjunctiva exhibited no abnormalities [Eyelids - No Xanthelasma] : the eyelids demonstrated no xanthelasmas [Normal Oral Mucosa] : normal oral mucosa [No Oral Pallor] : no oral pallor [No Oral Cyanosis] : no oral cyanosis [Normal Jugular Venous A Waves Present] : normal jugular venous A waves present [Normal Jugular Venous V Waves Present] : normal jugular venous V waves present [No Jugular Venous Cyr A Waves] : no jugular venous cyr A waves [Abnormal Walk] : normal gait [Gait - Sufficient For Exercise Testing] : the gait was sufficient for exercise testing [Skin Color & Pigmentation] : normal skin color and pigmentation [] : no rash [No Venous Stasis] : no venous stasis [Skin Lesions] : no skin lesions [No Skin Ulcers] : no skin ulcer [No Xanthoma] : no  xanthoma was observed [Oriented To Time, Place, And Person] : oriented to person, place, and time [Affect] : the affect was normal [Mood] : the mood was normal [No Anxiety] : not feeling anxious

## 2021-06-11 ENCOUNTER — NON-APPOINTMENT (OUTPATIENT)
Age: 61
End: 2021-06-11

## 2021-07-07 ENCOUNTER — NON-APPOINTMENT (OUTPATIENT)
Age: 61
End: 2021-07-07

## 2021-07-08 ENCOUNTER — APPOINTMENT (OUTPATIENT)
Dept: CARDIOLOGY | Facility: CLINIC | Age: 61
End: 2021-07-08
Payer: MEDICARE

## 2021-07-08 VITALS
SYSTOLIC BLOOD PRESSURE: 157 MMHG | DIASTOLIC BLOOD PRESSURE: 107 MMHG | BODY MASS INDEX: 47.61 KG/M2 | TEMPERATURE: 97.9 F | WEIGHT: 304 LBS | HEART RATE: 82 BPM

## 2021-07-08 PROCEDURE — 93285 PRGRMG DEV EVAL SCRMS IP: CPT

## 2021-07-08 PROCEDURE — 99213 OFFICE O/P EST LOW 20 MIN: CPT

## 2021-07-08 PROCEDURE — 93000 ELECTROCARDIOGRAM COMPLETE: CPT | Mod: 59

## 2021-07-12 NOTE — PHYSICAL EXAM
[General Appearance - Well Developed] : well developed [General Appearance - Well Nourished] : well nourished [No Deformities] : no deformities [Heart Rate And Rhythm] : heart rate and rhythm were normal [Heart Sounds] : normal S1 and S2 [] : no respiratory distress [Respiration, Rhythm And Depth] : normal respiratory rhythm and effort [Auscultation Breath Sounds / Voice Sounds] : lungs were clear to auscultation bilaterally [Left Infraclavicular] : left infraclavicular area [Clean] : clean [Dry] : dry [Well-Healed] : well-healed [Bowel Sounds] : normal bowel sounds [Abdomen Soft] : soft [Nail Clubbing] : no clubbing of the fingernails [Cyanosis, Localized] : no localized cyanosis
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
## 2021-07-12 NOTE — DISCUSSION/SUMMARY
[FreeTextEntry1] : Mr. Leandro Haley is a pleasant 60 year-old man with cardiomyopathy, improved EF, BEVERLEY on CPAP, hypertension, hyperlipidemia, and recurrent syncope. \par Etiology of syncope unclear; could be related to bradyarrhythmias, sleep apnea, or neurological. \par He underwent an ILR on 4/29/2021 for long term arrhythmia monitoring. \par He is following with a neurologist Dr. Montes; as per patient he had MRI and EEG.  \par \par Loop recorder interrogation today revealed : NSR with frequent PACs, see device section for details and reprogramming.\par \par BP elevated in the office, rechecked /90. He reports c/w meds. States his BP is much lower at home. Low sodium diet reinforced, food choices discussed. \par \par Plan \par -Follow-up with Dr. Garza/neuro\par -No tiana or tachy arrhythmias to correlate with recent episode of  ?syncope \par -Loop recorder was reprogrammed/ symptom activator provided\par -We will continue remote loop recorder monitoring \par -Continue current medications \par -BP log at home/ Low sodium diet \par -Follow up with cardio/Dr. Aldridge as scheduled. \par -Follow-up with sleep specialist to reassess BEVERLEY and CPAP settings.\par \par \par I discussed with him the plan of care in great details. I answered all his questions and he was satisfied with the visit. Patient will follow as scheduled in 3-4 months or sooner if arrhythmias detected on remote monitoring.  Please do not hesitate to contact me at [phone] if you have any questions regarding his care.

## 2021-07-12 NOTE — PROCEDURE
[No] : not [NSR] : normal sinus rhythm [See Scanned Paceart Report] : See scanned paceart report [See Device Printout] : See device printout [Voltage: ___ volts] : Voltage was [unfilled] volts [Timing (Intrinsic Rhythm)] : AV delay changed to promote intrinsic rhythm [de-identified] : LINQ  [de-identified] : Reveal  [de-identified] : [id number]  [de-identified] : 4/29/2021 [de-identified] : 5 episode of "Afib" - unable to open episodes for review since device is programmed to record only the longest episode. \par Episodes reviewed on [facility]/remote transmission; all episodes with noise/artifact, appears to be Sinus tach with PACs. \par I reprogrammed parameters to help to r/o Afib  : Afib detection to balance sensitivity (from least sensitive), AFib episode to all (from only the longest) , Jeffery detection to 40 bpm (from 30). Tachy detection rate:  decreased to 150 bpm from 171bpm. \par Provided symptom activator to report episodes of near syncope or syncope.   \par

## 2021-08-12 ENCOUNTER — NON-APPOINTMENT (OUTPATIENT)
Age: 61
End: 2021-08-12

## 2021-08-12 ENCOUNTER — APPOINTMENT (OUTPATIENT)
Dept: CARDIOLOGY | Facility: CLINIC | Age: 61
End: 2021-08-12
Payer: MEDICARE

## 2021-08-12 PROCEDURE — G2066: CPT

## 2021-08-12 PROCEDURE — 93298 REM INTERROG DEV EVAL SCRMS: CPT

## 2021-09-11 ENCOUNTER — EMERGENCY (EMERGENCY)
Facility: HOSPITAL | Age: 61
LOS: 0 days | Discharge: LEFT BEFORE DISCHARGE | End: 2021-09-11
Attending: EMERGENCY MEDICINE | Admitting: EMERGENCY MEDICINE
Payer: MEDICARE

## 2021-09-11 VITALS
WEIGHT: 294.98 LBS | DIASTOLIC BLOOD PRESSURE: 81 MMHG | OXYGEN SATURATION: 97 % | SYSTOLIC BLOOD PRESSURE: 145 MMHG | HEIGHT: 68 IN | TEMPERATURE: 99 F | RESPIRATION RATE: 18 BRPM | HEART RATE: 81 BPM

## 2021-09-11 DIAGNOSIS — R55 SYNCOPE AND COLLAPSE: ICD-10-CM

## 2021-09-11 DIAGNOSIS — Z87.09 PERSONAL HISTORY OF OTHER DISEASES OF THE RESPIRATORY SYSTEM: ICD-10-CM

## 2021-09-11 DIAGNOSIS — I49.3 VENTRICULAR PREMATURE DEPOLARIZATION: ICD-10-CM

## 2021-09-11 DIAGNOSIS — I10 ESSENTIAL (PRIMARY) HYPERTENSION: ICD-10-CM

## 2021-09-11 DIAGNOSIS — E78.5 HYPERLIPIDEMIA, UNSPECIFIED: ICD-10-CM
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
## 2021-09-11 DIAGNOSIS — Z95.818 PRESENCE OF OTHER CARDIAC IMPLANTS AND GRAFTS: ICD-10-CM

## 2021-09-11 DIAGNOSIS — Z99.89 DEPENDENCE ON OTHER ENABLING MACHINES AND DEVICES: ICD-10-CM
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
## 2021-09-11 DIAGNOSIS — F17.200 NICOTINE DEPENDENCE, UNSPECIFIED, UNCOMPLICATED: ICD-10-CM

## 2021-09-11 DIAGNOSIS — Z79.899 OTHER LONG TERM (CURRENT) DRUG THERAPY: ICD-10-CM

## 2021-09-11 LAB
ALBUMIN SERPL ELPH-MCNC: 4.1 G/DL — SIGNIFICANT CHANGE UP (ref 3.5–5.2)
ALP SERPL-CCNC: 64 U/L — SIGNIFICANT CHANGE UP (ref 30–115)
ALT FLD-CCNC: 22 U/L — SIGNIFICANT CHANGE UP (ref 0–41)
ANION GAP SERPL CALC-SCNC: 8 MMOL/L — SIGNIFICANT CHANGE UP (ref 7–14)
AST SERPL-CCNC: 30 U/L — SIGNIFICANT CHANGE UP (ref 0–41)
BASE EXCESS BLDV CALC-SCNC: -0.2 MMOL/L — SIGNIFICANT CHANGE UP (ref -2–3)
BASOPHILS # BLD AUTO: 0.06 K/UL — SIGNIFICANT CHANGE UP (ref 0–0.2)
BASOPHILS NFR BLD AUTO: 0.5 % — SIGNIFICANT CHANGE UP (ref 0–1)
BILIRUB SERPL-MCNC: 0.3 MG/DL — SIGNIFICANT CHANGE UP (ref 0.2–1.2)
BUN SERPL-MCNC: 16 MG/DL — SIGNIFICANT CHANGE UP (ref 10–20)
CA-I SERPL-SCNC: 1.19 MMOL/L — SIGNIFICANT CHANGE UP (ref 1.15–1.33)
CALCIUM SERPL-MCNC: 9.2 MG/DL — SIGNIFICANT CHANGE UP (ref 8.5–10.1)
CHLORIDE SERPL-SCNC: 102 MMOL/L — SIGNIFICANT CHANGE UP (ref 98–110)
CO2 SERPL-SCNC: 23 MMOL/L — SIGNIFICANT CHANGE UP (ref 17–32)
CREAT SERPL-MCNC: 1 MG/DL — SIGNIFICANT CHANGE UP (ref 0.7–1.5)
EOSINOPHIL # BLD AUTO: 0.04 K/UL — SIGNIFICANT CHANGE UP (ref 0–0.7)
EOSINOPHIL NFR BLD AUTO: 0.3 % — SIGNIFICANT CHANGE UP (ref 0–8)
GAS PNL BLDV: 134 MMOL/L — LOW (ref 136–145)
GAS PNL BLDV: SIGNIFICANT CHANGE UP
GLUCOSE SERPL-MCNC: 104 MG/DL — HIGH (ref 70–99)
HCO3 BLDV-SCNC: 27 MMOL/L — SIGNIFICANT CHANGE UP (ref 22–29)
HCT VFR BLD CALC: 45.4 % — SIGNIFICANT CHANGE UP (ref 42–52)
HCT VFR BLDA CALC: 47 % — SIGNIFICANT CHANGE UP (ref 39–51)
HGB BLD CALC-MCNC: 15.7 G/DL — SIGNIFICANT CHANGE UP (ref 12.6–17.4)
HGB BLD-MCNC: 15 G/DL — SIGNIFICANT CHANGE UP (ref 14–18)
IMM GRANULOCYTES NFR BLD AUTO: 0.3 % — SIGNIFICANT CHANGE UP (ref 0.1–0.3)
LACTATE BLDV-MCNC: 1.1 MMOL/L — SIGNIFICANT CHANGE UP (ref 0.5–2)
LYMPHOCYTES # BLD AUTO: 2.38 K/UL — SIGNIFICANT CHANGE UP (ref 1.2–3.4)
LYMPHOCYTES # BLD AUTO: 20.8 % — SIGNIFICANT CHANGE UP (ref 20.5–51.1)
MAGNESIUM SERPL-MCNC: 2.2 MG/DL — SIGNIFICANT CHANGE UP (ref 1.8–2.4)
MCHC RBC-ENTMCNC: 30.5 PG — SIGNIFICANT CHANGE UP (ref 27–31)
MCHC RBC-ENTMCNC: 33 G/DL — SIGNIFICANT CHANGE UP (ref 32–37)
MCV RBC AUTO: 92.3 FL — SIGNIFICANT CHANGE UP (ref 80–94)
MONOCYTES # BLD AUTO: 0.71 K/UL — HIGH (ref 0.1–0.6)
MONOCYTES NFR BLD AUTO: 6.2 % — SIGNIFICANT CHANGE UP (ref 1.7–9.3)
NEUTROPHILS # BLD AUTO: 8.22 K/UL — HIGH (ref 1.4–6.5)
NEUTROPHILS NFR BLD AUTO: 71.9 % — SIGNIFICANT CHANGE UP (ref 42.2–75.2)
NRBC # BLD: 0 /100 WBCS — SIGNIFICANT CHANGE UP (ref 0–0)
PCO2 BLDV: 54 MMHG — SIGNIFICANT CHANGE UP (ref 42–55)
PH BLDV: 7.31 — LOW (ref 7.32–7.43)
PLATELET # BLD AUTO: 210 K/UL — SIGNIFICANT CHANGE UP (ref 130–400)
PO2 BLDV: 23 MMHG — SIGNIFICANT CHANGE UP
POTASSIUM BLDV-SCNC: 4.2 MMOL/L — SIGNIFICANT CHANGE UP (ref 3.5–5.1)
POTASSIUM SERPL-MCNC: 5.8 MMOL/L — HIGH (ref 3.5–5)
POTASSIUM SERPL-SCNC: 5.8 MMOL/L — HIGH (ref 3.5–5)
PROT SERPL-MCNC: 6.6 G/DL — SIGNIFICANT CHANGE UP (ref 6–8)
RBC # BLD: 4.92 M/UL — SIGNIFICANT CHANGE UP (ref 4.7–6.1)
RBC # FLD: 14.8 % — HIGH (ref 11.5–14.5)
SAO2 % BLDV: 42 % — SIGNIFICANT CHANGE UP
SODIUM SERPL-SCNC: 133 MMOL/L — LOW (ref 135–146)
TROPONIN T SERPL-MCNC: <0.01 NG/ML — SIGNIFICANT CHANGE UP
WBC # BLD: 11.45 K/UL — HIGH (ref 4.8–10.8)
WBC # FLD AUTO: 11.45 K/UL — HIGH (ref 4.8–10.8)

## 2021-09-11 PROCEDURE — 99284 EMERGENCY DEPT VISIT MOD MDM: CPT

## 2021-09-11 PROCEDURE — 71045 X-RAY EXAM CHEST 1 VIEW: CPT | Mod: 26

## 2021-09-11 PROCEDURE — 93010 ELECTROCARDIOGRAM REPORT: CPT

## 2021-09-11 PROCEDURE — 70450 CT HEAD/BRAIN W/O DYE: CPT | Mod: 26,MA

## 2021-09-11 NOTE — ED PROVIDER NOTE - CLINICAL SUMMARY MEDICAL DECISION MAKING FREE TEXT BOX
Patient presents after a syncopal episode. labs, ekg, cxr, ct done. no acute findings. Patient has a loop recorder in place and states that he has an EP apt next week. 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 Risks discussed. Understands to follow up with EP and neurology. Patient signed out ama. Return precautions discussed.

## 2021-09-11 NOTE — ED PROVIDER NOTE - ATTENDING CONTRIBUTION TO CARE
Detail Level: Zone
Samples Given: CeraVe cream
60 yo M pmh of htn, hld, aylin, previous syncopal events with extensive work up presents with another synocpe. Patient states that he was standing in his kitchen when he syncopized. Fell backwards, hit the back of his head. No symptoms prior to fall, no chest pain, no shortness of breath, no palpitations. no n/v, no numbness, tingling or weakness. Patient reports a subsequent episode of syncope again. Called ems who found his FS to be in the 50s. Patient asymptomatic at this time. Patient has a loop recorder in place, last checked 2 weeks ago was normal.     CONSTITUTIONAL: Well-developed; well-nourished; in no acute distress.   SKIN: warm, dry  HEAD: Normocephalic; atraumatic.  EYES: PERRL, EOMI, no conjunctival erythema  ENT: No nasal discharge; airway clear.  NECK: Supple; non tender.  CARD: S1, S2 normal;  Regular rate and rhythm.   RESP: No wheezes, rales or rhonchi.  ABD: soft non tender, non distended, no rebound or guarding  EXT: Normal ROM.  5/5 strength in all 4 extremities   LYMPH: No acute cervical adenopathy.  NEURO: A&Ox3, CN 2-11 intact, moving all extremities, 5/5 strength, equal sensation bilaterally  PSYCH: Cooperative, appropriate.

## 2021-09-11 NOTE — ED PROVIDER NOTE - PHYSICAL EXAMINATION
CONSTITUTIONAL: Well-appearing; well-nourished; in no apparent distress.   EYES: PERRL; EOM intact.   ENT: normal nose; no rhinorrhea; normal pharynx with no tonsillar hypertrophy.   NECK: Supple; non-tender; no cervical lymphadenopathy.   CARDIOVASCULAR: Normal S1, S2; no murmurs, rubs, or gallops.   RESPIRATORY: Normal chest excursion with respiration; breath sounds clear and equal bilaterally; no wheezes, rhonchi, or rales.  GI/: Normal bowel sounds; non-distended; non-tender; no palpable organomegaly.   MS: No evidence of trauma or deformity. Normal ROM in all four extremities; non-tender to palpation; distal pulses are normal.   SKIN: Normal for age and race; warm; dry; good turgor; no apparent lesions or exudate.   NEURO/PSYCH: A & O x 4; grossly unremarkable. mood and manner are appropriate. no focal deficits. No facial droop. No tongue deviation. Cerebellar intact. Sensation intact

## 2021-09-11 NOTE — ED ADULT NURSE NOTE - CAS TRG GENERAL AIRWAY, MLM
Patent Continue Regimen: T-sal Detail Level: Simple Initiate Treatment: Repeat efudex treatment on chest Plan: Great results s/p efudex tx on chest

## 2021-09-11 NOTE — ED PROVIDER NOTE - NSFOLLOWUPINSTRUCTIONS_ED_ALL_ED_FT
**Follow up with your cardiologist and neurologist within 1 week**    Near-Syncope    Near-syncope is when you suddenly become weak or dizzy, or you feel like you might pass out (faint). During an episode of near-syncope, you may:  Feel dizzy or light-headed.  Feel nauseous.  See all white or all black in your field of vision.  Have cold, clammy skin.  This condition is caused by a sudden decrease in blood flow to the brain. This decrease can result from various causes, but most of those causes are not dangerous. However, near-syncope can be a sign of a serious medical problem, so it is important to seek medical care.    If you fainted, get medical help right away.Call your local emergency services (141 in the [place] Do not drive yourself to the hospital.    Follow these instructions at home:    Pay attention to any changes in your symptoms. Take these actions to help with your condition:  Have someone stay with you until you feel stable.  Do not drive, use machinery, or play sports until your health care provider says it is okay.  Keep all follow-up visits as told by your health care provider. This is important.  If you start to feel like you might faint, lie down right away and raise (elevate) your feet above the level of your heart. Breathe deeply and steadily. Wait until all of the symptoms have passed.  Drink enough fluid to keep your urine clear or pale yellow.  If you are taking blood pressure or heart medicine, get up slowly and take several minutes to sit and then stand. This can reduce dizziness.  Take over-the-counter and prescription medicines only as told by your health care provider.  Get help right away if:  You have a severe headache.  You have unusual pain in your chest, abdomen, or back.  You are bleeding from your mouth or rectum, or you have black or tarry stool.  You have a very fast or irregular heartbeat (palpitations).  You faint once or repeatedly.  You have a seizure.  You are confused.  You have trouble walking.  You have severe weakness.  You have vision problems.  These symptoms may represent a serious problem that is an emergency. Do not wait to see if your symptoms will go away. Get medical help right away. Call your local emergency services ([phone] in the [place] Do not drive yourself to the hospital.     This information is not intended to replace advice given to you by your health care provider. Make sure you discuss any questions you have with your health care provider.

## 2021-09-11 NOTE — ED PROVIDER NOTE - OBJECTIVE STATEMENT
61 year old M with hx of sleep apnea on CPAP, HTN, HLD, recurrent syncope presenting to ED s/p syncope x 2. Sts was at home when had witnessed episode of syncope by mother x 2. Pt fell onto hardwood floor. Sts did not have pre syncopal symptoms (no lightheadedness/dizziness, visual changes, nausea, vomiting, chest pain, sob). Pt has hx of recurrent syncope and follows with EP Dr. Mukherjee (had loop recorder placed 04/21). Pt has seen neurology Dr. Treadwell and has had neg MRI brain/eeg. Pt currently asymptomatic in ED.
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
## 2021-09-11 NOTE — ED PROVIDER NOTE - PATIENT PORTAL LINK FT
You can access the FollowMyHealth Patient Portal offered by [facility] by registering at the following website: [email or web address]. By joining [employer]’s FollowMyHealth portal, you will also be able to view your health information using other applications (apps) compatible with our system.

## 2021-09-11 NOTE — ED PROVIDER NOTE - CARE PROVIDER_API CALL
Wilner Bennett  Cardiology  [address] [place] [address]  Phone: [phone]  Fax: [phone]  Follow Up Time: 1-3 Days

## 2021-09-11 NOTE — ED PROVIDER NOTE - NS ED ROS FT
Constitutional: no fever, chills, no recent weight loss, change in appetite or malaise  Eyes: no redness/discharge/pain/vision changes  ENT: no rhinorrhea/ear pain/sore throat  Cardiac: No chest pain, SOB or edema.  Respiratory: No cough or respiratory distress  GI: No nausea, vomiting, diarrhea or abdominal pain.  : No dysuria, frequency, urgency or hematuria  MS: no pain to back or extremities, no loss of ROM, no weakness  Neuro: + syncope. No headache or weakness.   Skin: No skin rash.  Endocrine: No history of thyroid disease or diabetes.  Except as documented in the HPI, all other systems are negative.

## 2021-09-14 ENCOUNTER — APPOINTMENT (OUTPATIENT)
Dept: PULMONOLOGY | Facility: CLINIC | Age: 61
End: 2021-09-14
Payer: MEDICARE

## 2021-09-14 VITALS
HEART RATE: 72 BPM | OXYGEN SATURATION: 97 % | HEIGHT: 67 IN | WEIGHT: 315 LBS | DIASTOLIC BLOOD PRESSURE: 80 MMHG | BODY MASS INDEX: 49.44 KG/M2 | SYSTOLIC BLOOD PRESSURE: 124 MMHG

## 2021-09-14 DIAGNOSIS — Z95.818 PRESENCE OF OTHER CARDIAC IMPLANTS AND GRAFTS: ICD-10-CM

## 2021-09-14 DIAGNOSIS — Z86.79 PERSONAL HISTORY OF OTHER DISEASES OF THE CIRCULATORY SYSTEM: ICD-10-CM

## 2021-09-14 PROCEDURE — 99213 OFFICE O/P EST LOW 20 MIN: CPT

## 2021-09-14 NOTE — DISCUSSION/SUMMARY
[FreeTextEntry1] : compliant with use of bibap and deriving benefit-initially had probs with the humidification attachment

## 2021-09-16 ENCOUNTER — APPOINTMENT (OUTPATIENT)
Dept: CARDIOLOGY | Facility: CLINIC | Age: 61
End: 2021-09-16
Payer: MEDICARE

## 2021-09-16 ENCOUNTER — NON-APPOINTMENT (OUTPATIENT)
Age: 61
End: 2021-09-16

## 2021-09-16 PROCEDURE — 93298 REM INTERROG DEV EVAL SCRMS: CPT

## 2021-09-16 PROCEDURE — G2066: CPT

## 2021-09-28 ENCOUNTER — APPOINTMENT (OUTPATIENT)
Dept: CARDIOLOGY | Facility: CLINIC | Age: 61
End: 2021-09-28
Payer: MEDICARE

## 2021-09-28 VITALS
HEART RATE: 67 BPM | BODY MASS INDEX: 47.09 KG/M2 | DIASTOLIC BLOOD PRESSURE: 79 MMHG | RESPIRATION RATE: 16 BRPM | TEMPERATURE: 98 F | OXYGEN SATURATION: 98 % | SYSTOLIC BLOOD PRESSURE: 126 MMHG | WEIGHT: 300 LBS | HEIGHT: 67 IN

## 2021-09-28 PROCEDURE — 93291 INTERROG DEV EVAL SCRMS IP: CPT

## 2021-10-05 NOTE — DISCUSSION/SUMMARY
[FreeTextEntry1] : Mr. Leandro Haley is a pleasant 61 year-old man with cardiomyopathy, improved EF, BEVERLEY on CPAP, hypertension, hyperlipidemia, and recurrent syncope. \par Etiology of syncope unclear; could be related to bradyarrhythmias, sleep apnea, or neurological. \par He underwent an ILR on 4/29/2021 for long term arrhythmia monitoring. \par He is following with a neurologist Dr. Montes; as per patient he had MRI and EEG. \par \par Loop recorder interrogation today revealed : NSR with frequent PACs, see device section for details and reprogramming.\par \par Patient states that his life is so stressful taking care of her mother who has dementia. He drinks 8 cups of coffee a day and he neglects to drink water for hydration. He goes about his daily activities/ business in a rush. I recommended lifestyle modification by cutting back on coffee to 2 cups a day and hydrate himself at least 4 bottles of water a day. I explained to him the role of caffein and how it can affect the heart rhythm , caffein is also a diuretic making him dehydrated.I explained to him the role of water in  maintaining vascular volume."keeping the tank full so he can go about his busy day".\par Patient demonstrate understanding.\par \par \par Plan \par -Follow-up with Dr. Garza/neuro\par - he is also waiting to see siezure specialist\par -No tiana or tachy arrhythmias to correlate with recent episode of  ?syncope \par -Recommended life style modification. Cut back on coffee - takes 8 cups a day, doesn't drink water.\par  * drink at least 4 bottle (480-500ml/bottle) \par  * Use of compression stocking or support hose.\par \par -Follow up with cardio/Dr. Aldridge as scheduled. \par -Follow-up with sleep specialist to reassess BEVERLEY and CPAP settings.- saw Dr. Monique recently\par \par \par
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
## 2021-10-05 NOTE — PROCEDURE
[No] : not [NSR] : normal sinus rhythm [See Scanned Paceart Report] : See scanned paceart report [See Device Printout] : See device printout [Voltage: ___ volts] : Voltage was [unfilled] volts [Timing (Intrinsic Rhythm)] : AV delay changed to promote intrinsic rhythm [de-identified] : LINQ  [de-identified] : Reveal  [de-identified] : [id number]  [de-identified] : 4/29/2021 [de-identified] : 5 episode of "Afib" - unable to open episodes for review since device is programmed to record only the longest episode. \par Episodes reviewed on [facility]/remote transmission; all episodes with noise/artifact, appears to be Sinus tach with PACs. \par   : Afib detection to balance sensitivity (from least sensitive), AFib episode to all (from only the longest) , Jeffery detection to 30 bpm (from 40). Tachy detection rate at 150 bpm . \par Provided symptom activator to report episodes of near syncope or syncope.   \par

## 2021-10-22 ENCOUNTER — APPOINTMENT (OUTPATIENT)
Dept: CARDIOLOGY | Facility: CLINIC | Age: 61
End: 2021-10-22
Payer: MEDICARE

## 2021-10-22 ENCOUNTER — NON-APPOINTMENT (OUTPATIENT)
Age: 61
End: 2021-10-22

## 2021-10-22 PROCEDURE — G2066: CPT | Mod: NC

## 2021-10-22 PROCEDURE — 93298 REM INTERROG DEV EVAL SCRMS: CPT | Mod: NC

## 2021-11-03 ENCOUNTER — NON-APPOINTMENT (OUTPATIENT)
Age: 61
End: 2021-11-03

## 2021-11-06 ENCOUNTER — EMERGENCY (EMERGENCY)
Facility: HOSPITAL | Age: 61
LOS: 0 days | Discharge: HOME | End: 2021-11-06
Attending: EMERGENCY MEDICINE | Admitting: EMERGENCY MEDICINE
Payer: MEDICARE

## 2021-11-06 VITALS
HEART RATE: 68 BPM | OXYGEN SATURATION: 98 % | RESPIRATION RATE: 18 BRPM | SYSTOLIC BLOOD PRESSURE: 118 MMHG | DIASTOLIC BLOOD PRESSURE: 79 MMHG

## 2021-11-06 VITALS
TEMPERATURE: 99 F | OXYGEN SATURATION: 99 % | RESPIRATION RATE: 20 BRPM | SYSTOLIC BLOOD PRESSURE: 140 MMHG | HEART RATE: 66 BPM | DIASTOLIC BLOOD PRESSURE: 73 MMHG | WEIGHT: 294.98 LBS | HEIGHT: 68 IN

## 2021-11-06 DIAGNOSIS — R07.81 PLEURODYNIA: ICD-10-CM
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
## 2021-11-06 DIAGNOSIS — M54.9 DORSALGIA, UNSPECIFIED: ICD-10-CM

## 2021-11-06 PROCEDURE — 99283 EMERGENCY DEPT VISIT LOW MDM: CPT

## 2021-11-06 PROCEDURE — 71100 X-RAY EXAM RIBS UNI 2 VIEWS: CPT | Mod: 26,RT

## 2021-11-06 PROCEDURE — 71046 X-RAY EXAM CHEST 2 VIEWS: CPT | Mod: 26

## 2021-11-06 RX ORDER — ACETAMINOPHEN 500 MG
975 TABLET ORAL ONCE
Refills: 0 | Status: COMPLETED | OUTPATIENT
Start: 2021-11-06 | End: 2021-11-06

## 2021-11-06 RX ADMIN — Medication 975 MILLIGRAM(S): at 14:40

## 2021-11-06 NOTE — ED PROVIDER NOTE - NS ED ROS FT
Review of Systems    Constitutional: (-) fever/ chills (-)loss of appetite or  weight loss  Cardiovascular: (-) chest pain, (-) syncope (-) palpitations  Respiratory: (-) cough, (-) shortness of breath  neck: (-) neck pain or stiffness  Musculoskeletal:  (-) back pain, (-) joint pain   Integumentary: (-) rash, (-) swelling  Neurological: (-) headache, (-) altered mental status

## 2021-11-06 NOTE — ED PROVIDER NOTE - ATTENDING CONTRIBUTION TO CARE
62 yo M presents with rt sided rib/back pain s/p fall while changing a light bulb.  Pt states he hit his back on a book shelf.  Pain with movement. no SOB.  On exam pt in NAD AAO x 3, PERRL, old bruise to left cheek, no midline vertebral tenderness, Lungs with equal AE b/l, no bruising to back or abdomen, + tender rt lower lateral ribs, no crepitus

## 2021-11-06 NOTE — ED ADULT NURSE REASSESSMENT NOTE - NS ED NURSE REASSESS COMMENT FT1
Patient states "I am going outside to smoke a cigarette". Patient educated about no smoking policy and not to leave ED while under evaluation and risk to self. Patient verbalized understanding and said "I am still going outside, I'll be back". Patient a/o x 4, steady gait Routed to LUZ Mace   Please see below from patient.   FUV 11/30/21

## 2021-11-06 NOTE — ED PROVIDER NOTE - PATIENT PORTAL LINK FT
You can access the FollowMyHealth Patient Portal offered by [facility] by registering at the following website: [email or web address]. By joining [employer]’s FollowMyHealth portal, you will also be able to view your health information using other applications (apps) compatible with our system.

## 2021-11-06 NOTE — ED PROVIDER NOTE - CLINICAL SUMMARY MEDICAL DECISION MAKING FREE TEXT BOX
X ray obtained.  no obvious fx, no ptx.  Rec supportive care, ice.  Pt instructed to return if any worsening symptoms or concerns.  They verbalize understanding.

## 2021-11-06 NOTE — ED ADULT NURSE NOTE - NSIMPLEMENTINTERV_GEN_ALL_ED
Implemented All Universal Safety Interventions:  [place] to call system. Call bell, personal items and telephone within reach. Instruct patient to call for assistance. Room bathroom lighting operational. Non-slip footwear when patient is off stretcher. Physically safe environment: no spills, clutter or unnecessary equipment. Stretcher in lowest position, wheels locked, appropriate side rails in place.

## 2021-11-06 NOTE — ED PROVIDER NOTE - NSFOLLOWUPINSTRUCTIONS_ED_ALL_ED_FT
Rib Contusion  A rib contusion is a deep bruise on your rib area. Contusions are the result of a blunt trauma that causes bleeding and injury to the tissues under the skin. A rib contusion may involve bruising of the ribs and of the skin and muscles in the area. The skin over the contusion may turn blue, purple, or yellow. Minor injuries will give you a painless contusion. More severe contusions may stay painful and swollen for a few weeks.    What are the causes?  This condition is usually caused by a blow, trauma, or direct force to an area of the body. This often occurs while playing contact sports.    What are the signs or symptoms?  Symptoms of this condition include:  Swelling and redness of the injured area.  Discoloration of the injured area.  Tenderness and soreness of the injured area.  Pain with or without movement.  How is this diagnosed?  This condition may be diagnosed based on:  Your symptoms and medical history.  A physical exam.  Imaging tests—such as an X-ray, CT scan, or MRI—to determine if there were internal injuries or broken bones (fractures).  How is this treated?  This condition may be treated with:  Rest. This is often the best treatment for a rib contusion.  Icing. This reduces swelling and inflammation.  Deep-breathing exercises. These may be recommended to reduce the risk for lung collapse and pneumonia.  Medicines. Over-the-counter or prescription medicines may be given to control pain.  Injection of a numbing medicine around the nerve near your injury (nerve block).  Follow these instructions at home:    Medicines     Take over-the-counter and prescription medicines only as told by your health care provider.  Do not drive or use heavy machinery while taking prescription pain medicine.  If you are taking prescription pain medicine, take actions to prevent or treat constipation. Your health care provider may recommend that you:   Drink enough fluid to keep your urine pale yellow.   Eat foods that are high in fiber, such as fresh fruits and vegetables, whole grains, and beans.   Limit foods that are high in fat and processed sugars, such as fried or sweet foods.   Take an over-the-counter or prescription medicine for constipation.  Managing pain, stiffness, and swelling     If directed, put ice on the injured area:  Put ice in a plastic bag.  Place a towel between your skin and the bag.  Leave the ice on for 20 minutes, 2–3 times a day.  Rest the injured area. Avoid strenuous activity and any activities or movements that cause pain. Be careful during activities and avoid bumping the injured area.  Do not lift anything that is heavier than 5 lb (2.3 kg), or the limit that you are told, until your health care provider says that it is safe.  General instructions     Do not use any products that contain nicotine or tobacco, such as cigarettes and e-cigarettes. These can delay healing. If you need help quitting, ask your health care provider.  Do deep-breathing exercises as told by your health care provider.  If you were given an incentive spirometer, use it every 1–2 hours while you are awake, or as recommended by your health care provider. This device measures how well you are filling your lungs with each breath.  Keep all follow-up visits as told by your health care provider. This is important.  Contact a health care provider if you have:  Increased bruising or swelling.  Pain that is not controlled with treatment.  A fever.  Get help right away if you:  Have difficulty breathing or shortness of breath.  Develop a continual cough or you cough up thick or bloody sputum.  Feel nauseous or you vomit.  Have pain in your abdomen.  Summary  A rib contusion is a deep bruise on your rib area. Contusions are the result of a blunt trauma that causes bleeding and injury to the tissues under the skin.  The skin overlying the contusion may turn blue, purple, or yellow. Minor injuries may give you a painless contusion. More severe contusions may stay painful and swollen for a few weeks.  Rest the injured area. Avoid strenuous activity and any activities or movements that cause pain.  This information is not intended to replace advice given to you by your health care provider. Make sure you discuss any questions you have with your health care provider.

## 2021-11-06 NOTE — ED PROVIDER NOTE - OBJECTIVE STATEMENT
62 y/o M with no PMHx presents with right sided rib pain s/p fall today. Pt states he was changing a light bulb and while climbing down a ladder missed the last step and fell backwards, hitting his right side on a wooden bookshelf. Denies head injury and LOC. Denies hx of anticoagulants. No fever, chills, HA, CP, SOB, n/v, numbness, weakness or tingling.

## 2021-11-06 NOTE — ED PROVIDER NOTE - PHYSICAL EXAMINATION
CONSTITUTIONAL: Well-developed; well-nourished; in no acute distress.  SKIN: Skin exam is warm and dry, no acute rash.  HEAD: Normocephalic; atraumatic.  NECK: Supple; non tender.  RESP: No wheezes, rales or rhonchi.  MSK: +TTP right lateral mid ribs. Normal ROM. No clubbing, cyanosis or edema.  NEURO: Alert, oriented. Grossly unremarkable. No focal deficits.  PSYCH: Cooperative, appropriate.

## 2021-11-08 ENCOUNTER — APPOINTMENT (OUTPATIENT)
Dept: CARDIOLOGY | Facility: CLINIC | Age: 61
End: 2021-11-08
Payer: MEDICARE

## 2021-11-08 VITALS
WEIGHT: 300 LBS | SYSTOLIC BLOOD PRESSURE: 129 MMHG | TEMPERATURE: 98.5 F | HEIGHT: 67 IN | DIASTOLIC BLOOD PRESSURE: 78 MMHG | RESPIRATION RATE: 16 BRPM | BODY MASS INDEX: 47.09 KG/M2 | HEART RATE: 90 BPM

## 2021-11-08 PROCEDURE — 93291 INTERROG DEV EVAL SCRMS IP: CPT

## 2021-11-08 PROCEDURE — 99212 OFFICE O/P EST SF 10 MIN: CPT

## 2021-11-09 NOTE — PHYSICAL EXAM
[General Appearance - Well Developed] : well developed [No Deformities] : no deformities [General Appearance - Well Nourished] : well nourished [Heart Rate And Rhythm] : heart rate and rhythm were normal [Heart Sounds] : normal S1 and S2 [] : no respiratory distress [Respiration, Rhythm And Depth] : normal respiratory rhythm and effort [Auscultation Breath Sounds / Voice Sounds] : lungs were clear to auscultation bilaterally [Left Infraclavicular] : left infraclavicular area [Dry] : dry [Clean] : clean [Well-Healed] : well-healed [Bowel Sounds] : normal bowel sounds [Abdomen Soft] : soft [Nail Clubbing] : no clubbing of the fingernails [Cyanosis, Localized] : no localized cyanosis

## 2021-11-10 NOTE — PROCEDURE
[Voltage: ___ volts] : Voltage was [unfilled] volts [Normal] : The battery status is normal. [None] : none [Counters Reset] : the counters were reset [de-identified] : DANIELE [de-identified] : LNQ11 [de-identified] : [id number] [de-identified] : 4/29/2021 [de-identified] : BATTERY GOOD  [de-identified] : Turned off  AT detection\par confirmed PAF longest episode for 46 sec on 11/3\par \par BURDEN 5.5%
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
## 2021-11-10 NOTE — DISCUSSION/SUMMARY
[FreeTextEntry1] : Mr. Leandro Haley is a pleasant 61 year-old man with cardiomyopathy, improved EF, BEVERLEY on CPAP, hypertension, hyperlipidemia, and recurrent syncope. \par Etiology of syncope unclear; could be related to bradyarrhythmias, sleep apnea, or neurological. \par He underwent an ILR on 4/29/2021 for long term arrhythmia monitoring. \par He is following with a neurologist Dr. Montes; as per patient he had MRI and EEG. \par \par Loop recorder interrogation today revealed : NSR with frequent PACs, AT, noise and PAF \par \par Newly diagnosed Paroxysmal Afib. His CHADS -VASc is 2 ( , HTN, CM )\par I discussed with the patient  stroke prevention based on CHADS-VASc score, and options to reduce that risk with  initiation of AC. \par Patient was assessed for bleeding risks  based on HAS-BLED  1 score and educated on modifiable bleeding risk factors such as correct dosage, frequency, adherence, to avoid  concomitant use of over-the -counter nonsteroidal anti-inflammatory medications, excessive alcohol intake, and compliance with antihypertensive meds to control blood pressure.\par Potential complications associated with AC such as bleeding , signs and symptoms, when to call office and when to seek immediate medical attention/ ER visit  discussed in great  details.\par \par He is also interested to discuss possible RF ablations in the future.\par \par Plan \par - Scheduled an appointment to see Dr. Bennett on the 15th \par - Continue Recommended life style modification. Cut back on coffee - takes 8 cups a day, doesn't drink    water.\par  * drink at least 4 bottle (480-500ml/bottle) \par  * Use of compression stocking or support hose.\par - Continue CPAP - compliant\par - Refused to get new blood works - will fax recent blood test\par \par

## 2021-11-15 ENCOUNTER — APPOINTMENT (OUTPATIENT)
Dept: CARDIOLOGY | Facility: CLINIC | Age: 61
End: 2021-11-15
Payer: MEDICARE

## 2021-11-15 VITALS
BODY MASS INDEX: 46.77 KG/M2 | DIASTOLIC BLOOD PRESSURE: 78 MMHG | WEIGHT: 298 LBS | HEIGHT: 67 IN | SYSTOLIC BLOOD PRESSURE: 142 MMHG

## 2021-11-15 PROCEDURE — 99214 OFFICE O/P EST MOD 30 MIN: CPT

## 2021-11-15 PROCEDURE — 93000 ELECTROCARDIOGRAM COMPLETE: CPT

## 2021-11-22 NOTE — ED PROVIDER NOTE - ST/T WAVE
Script Eprescribed to pharmacy      Signed Prescriptions:                        Disp   Refills    venlafaxine (EFFEXOR) 75 MG tablet         120 ta*3        Sig: Take 1 tablet (75 mg) by mouth 2 times daily  Authorizing Provider: MEG BENNETT MD  [facility]   
Pending Prescriptions:                       Disp   Refills    venlafaxine (EFFEXOR) 75 MG tablet        53 tab*3            Sig: Take 0.5 tablets (37.5 mg) by mouth 2 times daily           for 7 days, THEN 1 tablet (75 mg) 2 times daily           for 23 days.    Last refill  10/17/21  Last office visit 8/30/21  Next appointment None    
Refills have been requested for the following medications:         venlafaxine (EFFEXOR) 75 MG tablet [Ivon Shannon MD]       Patient Comment: I need this tomorrow as I have one left and I am leaving [facility] for a week on Tues     Preferred pharmacy: [facility], [place] - [address] [place] [address]     
no henny

## 2021-11-29 ENCOUNTER — APPOINTMENT (OUTPATIENT)
Dept: NEUROLOGY | Facility: CLINIC | Age: 61
End: 2021-11-29
Payer: MEDICARE

## 2021-11-29 VITALS
WEIGHT: 298 LBS | DIASTOLIC BLOOD PRESSURE: 70 MMHG | SYSTOLIC BLOOD PRESSURE: 129 MMHG | BODY MASS INDEX: 46.77 KG/M2 | OXYGEN SATURATION: 97 % | TEMPERATURE: 98.1 F | HEIGHT: 67 IN

## 2021-11-29 DIAGNOSIS — F17.200 NICOTINE DEPENDENCE, UNSPECIFIED, UNCOMPLICATED: ICD-10-CM

## 2021-11-29 DIAGNOSIS — G47.33 OBSTRUCTIVE SLEEP APNEA (ADULT) (PEDIATRIC): ICD-10-CM

## 2021-11-29 PROCEDURE — 99204 OFFICE O/P NEW MOD 45 MIN: CPT

## 2021-12-01 ENCOUNTER — NON-APPOINTMENT (OUTPATIENT)
Age: 61
End: 2021-12-01

## 2021-12-01 ENCOUNTER — APPOINTMENT (OUTPATIENT)
Dept: CARDIOLOGY | Facility: CLINIC | Age: 61
End: 2021-12-01
Payer: MEDICARE

## 2021-12-01 PROCEDURE — 93298 REM INTERROG DEV EVAL SCRMS: CPT

## 2021-12-01 PROCEDURE — G2066: CPT
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
## 2021-12-02 NOTE — DISCUSSION/SUMMARY
[Evaluation for interictal epileptiform activity, subclinical seizures, and risk stratification (typically 2-3 days)] : Evaluation for interictal epileptiform activity, subclinical seizures, and risk stratification (typically 2-3 days) [FreeTextEntry1] : Leandro is a 61 year old right handed male with PMH of obesity, HTN, BEVERLEY on CPAP, Hyperlipidemia and Afib presented to the office for evaluation of syncopal episodes that started about 1 year ago. Based on history and physical exam pts syncopal events could be related to seizure or sleep disorder.\par Will start with VEEG for evaluation of epileptiform activity and subclinical seizures . Pt will follow up after the VEEG is complete.\par \par Case discussed with Dr. Her\par \par \par Elvi Lockett, DNP, ACNP-BC

## 2021-12-02 NOTE — PHYSICAL EXAM
[Looks Tired] : appears tired [Normal Verbal Skills] : the patient had normal verbal communication skills [Person] : oriented to person [Place] : oriented to place [Time] : oriented to time [Agitated] : agitated [Normal Rate] : a normal rate [Normal Tone] : normal tone [Cranial Nerves Optic (II)] : visual acuity intact bilaterally,  visual fields full to confrontation, pupils equal round and reactive to light [Cranial Nerves Oculomotor (III)] : extraocular motion intact [Cranial Nerves Trigeminal (V)] : facial sensation intact symmetrically [Cranial Nerves Facial (VII)] : face symmetrical [Cranial Nerves Vestibulocochlear (VIII)] : hearing was intact bilaterally [Cranial Nerves Glossopharyngeal (IX)] : tongue and palate midline [Cranial Nerves Accessory (XI - Cranial And Spinal)] : head turning and shoulder shrug symmetric [Cranial Nerves Hypoglossal (XII)] : there was no tongue deviation with protrusion [Motor Strength] : muscle strength was normal in all four extremities [Involuntary Movements] : no involuntary movements were seen [No Muscle Atrophy] : normal bulk in all four extremities [Motor Handedness Right-Handed] : the patient is right hand dominant [Paresis Pronator Drift Right-Sided] : no pronator drift on the right [Paresis Pronator Drift Left-Sided] : no pronator drift on the left [Motor Strength Upper Extremities Bilaterally] : strength was normal in both upper extremities [Motor Strength Lower Extremities Bilaterally] : strength was normal in both lower extremities [Limited Balance] : balance was intact [Past-pointing] : there was no past-pointing [Tremor] : no tremor present [Coordination - Dysmetria Impaired Finger-to-Nose Bilateral] : not present

## 2021-12-03 ENCOUNTER — TRANSCRIPTION ENCOUNTER (OUTPATIENT)
Age: 61
End: 2021-12-03

## 2021-12-03 ENCOUNTER — OUTPATIENT (OUTPATIENT)
Dept: OUTPATIENT SERVICES | Facility: HOSPITAL | Age: 61
LOS: 1 days | Discharge: HOME | End: 2021-12-03
Payer: MEDICARE

## 2021-12-03 VITALS — HEIGHT: 69 IN | WEIGHT: 288.81 LBS

## 2021-12-03 VITALS
HEART RATE: 68 BPM | DIASTOLIC BLOOD PRESSURE: 67 MMHG | SYSTOLIC BLOOD PRESSURE: 129 MMHG | RESPIRATION RATE: 17 BRPM | OXYGEN SATURATION: 98 %

## 2021-12-03 PROCEDURE — 43264 ERCP REMOVE DUCT CALCULI: CPT

## 2021-12-03 PROCEDURE — 43262 ENDO CHOLANGIOPANCREATOGRAPH: CPT | Mod: XU

## 2021-12-03 PROCEDURE — 74328 X-RAY BILE DUCT ENDOSCOPY: CPT | Mod: 26

## 2021-12-03 RX ORDER — ONDANSETRON 8 MG/1
4 TABLET, FILM COATED ORAL ONCE
Refills: 0 | Status: COMPLETED | OUTPATIENT
Start: 2021-12-03 | End: 2021-12-03

## 2021-12-03 RX ORDER — INDOMETHACIN 50 MG
100 CAPSULE ORAL ONCE
Refills: 0 | Status: COMPLETED | OUTPATIENT
Start: 2021-12-03 | End: 2021-12-03

## 2021-12-03 RX ORDER — CIPROFLOXACIN LACTATE 400MG/40ML
1 VIAL (ML) INTRAVENOUS
Qty: 6 | Refills: 0
Start: 2021-12-03

## 2021-12-03 RX ORDER — MORPHINE SULFATE 50 MG/1
1 CAPSULE, EXTENDED RELEASE ORAL ONCE
Refills: 0 | Status: DISCONTINUED | OUTPATIENT
Start: 2021-12-03 | End: 2021-12-03

## 2021-12-03 RX ADMIN — ONDANSETRON 4 MILLIGRAM(S): 8 TABLET, FILM COATED ORAL at 14:57

## 2021-12-03 RX ADMIN — Medication 100 MILLIGRAM(S): at 10:55

## 2021-12-03 RX ADMIN — MORPHINE SULFATE 1 MILLIGRAM(S): 50 CAPSULE, EXTENDED RELEASE ORAL at 14:56

## 2021-12-03 NOTE — ASU DISCHARGE PLAN (ADULT/PEDIATRIC) - NS MD DC FALL RISK RISK
For information on Fall & Injury Prevention, visit: [email or web address] OR  [email or web address] OR  https://www.cdc.gov/steadi/patient.html

## 2021-12-03 NOTE — ASU PATIENT PROFILE, ADULT - FALL HARM RISK - UNIVERSAL INTERVENTIONS
Bed in lowest position, wheels locked, appropriate side rails in place/Call bell, personal items and telephone in reach/Instruct patient to call for assistance before getting out of bed or chair/Non-slip footwear when patient is out of bed/[place] to call system/Physically safe environment - no spills, clutter or unnecessary equipment/Purposeful Proactive Rounding/Room/bathroom lighting operational, light cord in reach

## 2021-12-03 NOTE — CHART NOTE - NSCHARTNOTEFT_GEN_A_CORE
PACU ANESTHESIA ADMISSION NOTE      Procedure:   Post op diagnosis:      ____  Intubated  TV:______       Rate: ______      FiO2: ______    __x__  Patent Airway    __x__  Full return of protective reflexes    __x__  Full recovery from anesthesia / back to baseline     Vitals:   T:  97.6         R:   18               BP: 136/73                 Sat:  100%                 P: 77      Mental Status:  __x__ Awake   __x___ Alert   _____ Drowsy   _____ Sedated    Nausea/Vomiting:  __x__ NO  ______Yes,   See Post - Op Orders          Pain Scale (0-10):  __0___    Treatment: ____ None    ___x_ See Post - Op/PCA Orders    Post - Operative Fluids:   ____ Oral   __x__ See Post - Op Orders    Plan: Discharge:   __x__Home       _____Floor     _____Critical Care    _____  Other:_________________    Comments:  pt tolerated procedure well, pt transferred to PACU and report was given to PACU RN, vital signs are stable and pt shows no signs of distress. no anesthesia complications, discharge pt when criteria met.

## 2021-12-09 DIAGNOSIS — K80.70 CALCULUS OF GALLBLADDER AND BILE DUCT WITHOUT CHOLECYSTITIS WITHOUT OBSTRUCTION: ICD-10-CM

## 2021-12-09 DIAGNOSIS — I42.9 CARDIOMYOPATHY, UNSPECIFIED: ICD-10-CM

## 2021-12-09 DIAGNOSIS — E66.9 OBESITY, UNSPECIFIED: ICD-10-CM

## 2021-12-09 DIAGNOSIS — I10 ESSENTIAL (PRIMARY) HYPERTENSION: ICD-10-CM

## 2021-12-09 DIAGNOSIS — K21.9 GASTRO-ESOPHAGEAL REFLUX DISEASE WITHOUT ESOPHAGITIS: ICD-10-CM

## 2021-12-09 DIAGNOSIS — E78.5 HYPERLIPIDEMIA, UNSPECIFIED: ICD-10-CM

## 2021-12-09 DIAGNOSIS — Z79.01 LONG TERM (CURRENT) USE OF ANTICOAGULANTS: ICD-10-CM

## 2021-12-09 DIAGNOSIS — Z87.891 PERSONAL HISTORY OF NICOTINE DEPENDENCE: ICD-10-CM

## 2021-12-09 DIAGNOSIS — G47.33 OBSTRUCTIVE SLEEP APNEA (ADULT) (PEDIATRIC): ICD-10-CM

## 2021-12-09 PROBLEM — E78.00 PURE HYPERCHOLESTEROLEMIA, UNSPECIFIED: Chronic | Status: ACTIVE | Noted: 2021-12-03

## 2021-12-10 ENCOUNTER — LABORATORY RESULT (OUTPATIENT)
Age: 61
End: 2021-12-10

## 2021-12-13 ENCOUNTER — INPATIENT (INPATIENT)
Facility: HOSPITAL | Age: 61
LOS: 1 days | Discharge: HOME | End: 2021-12-15
Attending: HOSPITALIST | Admitting: HOSPITALIST
Payer: MEDICARE

## 2021-12-13 VITALS — TEMPERATURE: 99 F | SYSTOLIC BLOOD PRESSURE: 138 MMHG | RESPIRATION RATE: 16 BRPM | DIASTOLIC BLOOD PRESSURE: 67 MMHG

## 2021-12-13 PROCEDURE — 99221 1ST HOSP IP/OBS SF/LOW 40: CPT

## 2021-12-13 RX ORDER — TOPIRAMATE 25 MG
200 TABLET ORAL EVERY 12 HOURS
Refills: 0 | Status: DISCONTINUED | OUTPATIENT
Start: 2021-12-13 | End: 2021-12-14

## 2021-12-13 RX ORDER — TOPIRAMATE 25 MG
200 TABLET ORAL
Refills: 0 | Status: DISCONTINUED | OUTPATIENT
Start: 2021-12-13 | End: 2021-12-13

## 2021-12-13 RX ORDER — CHLORHEXIDINE GLUCONATE 213 G/1000ML
1 SOLUTION TOPICAL
Refills: 0 | Status: DISCONTINUED | OUTPATIENT
Start: 2021-12-13 | End: 2021-12-15

## 2021-12-13 RX ORDER — TOPIRAMATE 25 MG
0 TABLET ORAL
Qty: 0 | Refills: 0 | DISCHARGE

## 2021-12-13 RX ORDER — METOPROLOL TARTRATE 50 MG
0 TABLET ORAL
Qty: 0 | Refills: 0 | DISCHARGE

## 2021-12-13 RX ORDER — ATORVASTATIN CALCIUM 80 MG/1
10 TABLET, FILM COATED ORAL AT BEDTIME
Refills: 0 | Status: DISCONTINUED | OUTPATIENT
Start: 2021-12-13 | End: 2021-12-15

## 2021-12-13 RX ORDER — APIXABAN 2.5 MG/1
5 TABLET, FILM COATED ORAL
Refills: 0 | Status: DISCONTINUED | OUTPATIENT
Start: 2021-12-13 | End: 2021-12-15

## 2021-12-13 RX ORDER — ONDANSETRON 8 MG/1
4 TABLET, FILM COATED ORAL EVERY 8 HOURS
Refills: 0 | Status: DISCONTINUED | OUTPATIENT
Start: 2021-12-13 | End: 2021-12-15

## 2021-12-13 RX ORDER — DIAZEPAM 5 MG
0 TABLET ORAL
Qty: 0 | Refills: 0 | DISCHARGE

## 2021-12-13 RX ORDER — METOPROLOL TARTRATE 50 MG
100 TABLET ORAL DAILY
Refills: 0 | Status: DISCONTINUED | OUTPATIENT
Start: 2021-12-13 | End: 2021-12-15

## 2021-12-13 RX ORDER — TRAMADOL HYDROCHLORIDE 50 MG/1
50 TABLET ORAL EVERY 6 HOURS
Refills: 0 | Status: DISCONTINUED | OUTPATIENT
Start: 2021-12-13 | End: 2021-12-15

## 2021-12-13 RX ORDER — ACETAMINOPHEN 500 MG
650 TABLET ORAL EVERY 6 HOURS
Refills: 0 | Status: DISCONTINUED | OUTPATIENT
Start: 2021-12-13 | End: 2021-12-15

## 2021-12-13 RX ADMIN — Medication 200 MILLIGRAM(S): at 22:25

## 2021-12-13 RX ADMIN — Medication 100 MILLIGRAM(S): at 22:24

## 2021-12-13 RX ADMIN — APIXABAN 5 MILLIGRAM(S): 2.5 TABLET, FILM COATED ORAL at 22:23

## 2021-12-13 RX ADMIN — ATORVASTATIN CALCIUM 10 MILLIGRAM(S): 80 TABLET, FILM COATED ORAL at 22:24

## 2021-12-13 NOTE — CONSULT NOTE ADULT - ATTENDING COMMENTS
Agree with the Hx and the plan  viki  carries the diagnosis of BEVERLEY and also A-Fib  and anxiety/mood D/O  However he also has had at least two episodes as described above that is not suggestive of a syncopal event   He is here for risk assessment  will start the monitoring  seizure precautions  no change in Topamax

## 2021-12-13 NOTE — PATIENT PROFILE ADULT - FALL HARM RISK - UNIVERSAL INTERVENTIONS
Bed in lowest position, wheels locked, appropriate side rails in place/Call bell, personal items and telephone in reach/Instruct patient to call for assistance before getting out of bed or chair/Non-slip footwear when patient is out of bed/[place] to call system/Physically safe environment - no spills, clutter or unnecessary equipment/Purposeful Proactive Rounding/Room/bathroom lighting operational, light cord in reach
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
## 2021-12-13 NOTE — H&P ADULT - NSHPPHYSICALEXAM_GEN_ALL_CORE
Vital Signs (24 Hrs):  T(C): 37.4 (12-13-21 @ 14:47), Max: 37.4 (12-13-21 @ 14:47)  HR: --  BP: 138/67 (12-13-21 @ 14:47) (138/67 - 138/67)  RR: 16 (12-13-21 @ 14:47) (16 - 16)    PHYSICAL EXAM:  GENERAL: NAD, well-developed  SKIN: No rashes or lesions  HEAD:  Atraumatic, Normocephalic  EYES: EOMI, PERRLA, conjunctiva and sclera clear  NECK: Supple, No JVD  CHEST/LUNG: Clear to auscultation bilaterally; No wheeze  HEART: Regular rate and rhythm; No murmurs, rubs, or gallops  ABDOMEN: Soft, Nontender, Nondistended; Bowel sounds present  EXTREMITIES:  No clubbing, cyanosis, or edema  CNS: AAOx3

## 2021-12-13 NOTE — H&P ADULT - HISTORY OF PRESENT ILLNESS
61M w/PMHx of AFIB on DOAC, HTN, HLD, obesity, BEVERLEY (CPAP), recurrent syncope sent for elective V-EEG admission.  Patient reports he has been having episodes of recurrent syncope and has undergone extensive testing.  He reports he was sent for further evaluation.  Patient reports he feels well, no complaints of CP/SOB.  No abdominal or or urinary complaints.   61M w/PMHx of chronic AFIB on DOAC, HTN, HLD, obesity, BEVERLEY (CPAP), recurrent syncope /unresponsive episodes sent for elective V-EEG admission.  Patient reports he has been having episodes of recurrent syncope and has undergone extensive testing.  He reports he was sent for further evaluation.  Patient reports he feels well, no complaints of CP/SOB.  No abdominal or or urinary complaints.

## 2021-12-13 NOTE — H&P ADULT - ASSESSMENT
61M w/PMHx of AFIB on DOAC, HTN, HLD, obesity, BEVERLEY (CPAP), recurrent syncope sent for elective V-EEG admission, admitted to medicine service for further management.    #Syncope r/o Seizure Disorder  - admit to medicine  - Neurology c/s for V-EEG monitoring  - Sz precautions  - Lorazepam 2mg IVP q4h PRN  - AED's per Neuro  - Labs per Neuro  - DVT PPX (Eliquis)  - CHG 4% QD and PRN     #AFIB  - + rate control  - c/w BB  - c/w DOAC    #HTN/HLD  - c/w current Rx  - adjust PRN    #Chronic LBP  - c/w Tramadol PRN     61M w/PMHx of AFIB on DOAC, HTN, HLD, obesity, BEVERLEY (CPAP), recurrent syncope sent for elective V-EEG admission, admitted to medicine service for further management.    #Syncope r/o Seizure Disorder  - admit to medicine  - Neurology c/s for V-EEG monitoring  - Sz precautions  - Lorazepam 2mg IVP q4h PRN  - AED's per Neuro- cont home dose tomapmax  - Labs per Neuro  - DVT PPX (Eliquis)  - CHG 4% QD and PRN     #AFIB chronic   - + rate control  - c/w BB  - c/w DOAC    #HTN/HLD  - c/w current Rx  - adjust PRN    #Chronic LBP  - c/w Tramadol PRN

## 2021-12-13 NOTE — H&P ADULT - ATTENDING COMMENTS
Patient seen and examined independently. I agree with the resident's note, physical exam, and plan except as below.  edits as above  veeg admission   further characterization   cont home dose topamax  seizure precautions  follow up neuro recs

## 2021-12-13 NOTE — CONSULT NOTE ADULT - CONSULT REASON
formerly [facility] monitoring for better characterization and risk assessment VE monitoring for better characterization and assessment

## 2021-12-14 VITALS
RESPIRATION RATE: 16 BRPM | SYSTOLIC BLOOD PRESSURE: 148 MMHG | DIASTOLIC BLOOD PRESSURE: 78 MMHG | TEMPERATURE: 98 F | HEART RATE: 56 BPM

## 2021-12-14 LAB
HCV AB S/CO SERPL IA: 0.04 COI — SIGNIFICANT CHANGE UP
HCV AB SERPL-IMP: SIGNIFICANT CHANGE UP

## 2021-12-14 PROCEDURE — 99232 SBSQ HOSP IP/OBS MODERATE 35: CPT

## 2021-12-14 PROCEDURE — 99231 SBSQ HOSP IP/OBS SF/LOW 25: CPT

## 2021-12-14 PROCEDURE — 95720 EEG PHY/QHP EA INCR W/VEEG: CPT

## 2021-12-14 RX ORDER — TOPIRAMATE 25 MG
150 TABLET ORAL EVERY 12 HOURS
Refills: 0 | Status: DISCONTINUED | OUTPATIENT
Start: 2021-12-14 | End: 2021-12-15

## 2021-12-14 RX ADMIN — APIXABAN 5 MILLIGRAM(S): 2.5 TABLET, FILM COATED ORAL at 17:35

## 2021-12-14 RX ADMIN — Medication 5 MILLIGRAM(S): at 17:35

## 2021-12-14 RX ADMIN — APIXABAN 5 MILLIGRAM(S): 2.5 TABLET, FILM COATED ORAL at 06:00

## 2021-12-14 RX ADMIN — Medication 150 MILLIGRAM(S): at 09:54

## 2021-12-14 RX ADMIN — ATORVASTATIN CALCIUM 10 MILLIGRAM(S): 80 TABLET, FILM COATED ORAL at 21:32

## 2021-12-14 RX ADMIN — Medication 150 MILLIGRAM(S): at 21:32

## 2021-12-14 RX ADMIN — Medication 100 MILLIGRAM(S): at 21:32

## 2021-12-14 RX ADMIN — Medication 5 MILLIGRAM(S): at 06:00

## 2021-12-14 NOTE — PROGRESS NOTE ADULT - ASSESSMENT
61M w/PMHx of AFIB on DOAC, HTN, HLD, obesity, BEVERLEY (CPAP), recurrent syncope sent for elective V-EEG admission, admitted to medicine service for further management.    #Syncope r/o Seizure Disorder  - admit to medicine  - Neurology c/s for V-EEG monitoring  - Sz precautions  - Lorazepam 2mg IVP q4h PRN  - AED's per Neuro- cont home dose tomapmax  - Labs per Neuro  - DVT PPX (Eliquis)  - CHG 4% QD and PRN     #AFIB chronic   - + rate control  - c/w BB  - c/w DOAC    #HTN/HLD  - c/w current Rx  - adjust PRN      VEEG in the last 24 hours:    Background---- continues, well organized reaching frequencies in the range of 8-9 hz    Focal and generalized slowing----------------- no focal and generalized slowing    Interictal activity--  rare right FT sharp waves and low amplitude spike     Events-----none    Seizures------none    Impression:   abnormal as above    Plan - the results were discussed with him. will continue the monitoring          Will decrease the Topamax to 150 bid. seizure precaution

## 2021-12-14 NOTE — PROGRESS NOTE ADULT - SUBJECTIVE AND OBJECTIVE BOX
PHILLIPVIANEYCARA LERMA  61y, Male  Allergy: No Known Allergies      CHIEF COMPLAINT: V-EEG (14 Dec 2021 10:11)      HPI:  61M w/PMHx of chronic AFIB on DOAC, HTN, HLD, obesity, BEVERLEY (CPAP), recurrent syncope /unresponsive episodes sent for elective V-EEG admission.  Patient reports he has been having episodes of recurrent syncope and has undergone extensive testing.  He reports he was sent for further evaluation.  Patient reports he feels well, no complaints of CP/SOB.  No abdominal or or urinary complaints.   (13 Dec 2021 15:17)    HPI:    FAMILY HISTORY:    PAST MEDICAL & SURGICAL HISTORY:  Sleep apnea    Back pain    High cholesterol    Afib    No significant past surgical history        SOCIAL HISTORY  Social History:  Current smoker of 40 years, about 1 PPD  Denies ETOH or illicit drug use (29 Apr 2021 09:11)        ROS  General: Denies fevers, chills, nightsweats, weight loss  HEENT: Denies headache, rhinorrhea, sore throat, eye pain  CV: Denies CP, palpitations  PULM: Denies SOB, cough  GI: Denies abdominal pain, diarrhea  : Denies dysuria, hematuria  MSK: Denies arthralgias  SKIN: Denies rash   NEURO: Denies paresthesias, weakness  PSYCH: Denies depression    VITALS:  T(F): 97.6, Max: 97.9 (12-14-21 @ 13:22)  HR: 56  BP: 148/78  RR: 16Vital Signs Last 24 Hrs  T(C): 36.4 (14 Dec 2021 21:09), Max: 36.6 (14 Dec 2021 13:22)  T(F): 97.6 (14 Dec 2021 21:09), Max: 97.9 (14 Dec 2021 13:22)  HR: 56 (14 Dec 2021 21:09) (56 - 65)  BP: 148/78 (14 Dec 2021 21:09) (127/72 - 148/78)  BP(mean): --  RR: 16 (14 Dec 2021 21:09) (16 - 16)  SpO2: --    PHYSICAL EXAM:  Gen: NAD, resting in bed  HEENT: Normocephalic, atraumatic  Neck: supple, no lymphadenopathy  CV: Regular rate & regular rhythm  Lungs: decreased BS at bases, no fremitus  Abdomen: Soft, BS present  Ext: Warm, well perfused  Neuro: non focal, awake  Skin: no rash, no erythema  Psych: no SI, HI, Hallucination     TESTS & MEASUREMENTS:                        14.7   9.95  )-----------( 205      ( 13 Dec 2021 19:30 )             45.7     12-13    142  |  108  |  18  ----------------------------<  119<H>  4.1   |  19  |  1.3    Ca    9.7      13 Dec 2021 19:30  Mg     2.1     12-13    TPro  6.7  /  Alb  4.4  /  TBili  0.3  /  DBili  x   /  AST  18  /  ALT  25  /  AlkPhos  92  12-13      LIVER FUNCTIONS - ( 13 Dec 2021 19:30 )  Alb: 4.4 g/dL / Pro: 6.7 g/dL / ALK PHOS: 92 U/L / ALT: 25 U/L / AST: 18 U/L / GGT: x                   QRS axis to [] ° and NSR at a rate of [] BPM. There was no atrial enlargement. There was no ventricular hypertrophy. There were no ST-T changes and all intervals were normal.      INFECTIOUS DISEASES TESTING      RADIOLOGY & ADDITIONAL TESTS:  I have personally reviewed the last Chest xray  CXR      CT      CARDIOLOGY TESTING      MEDICATIONS  apixaban 5  atorvastatin 10  chlorhexidine 4% Liquid 1  enalapril 5  metoprolol succinate   topiramate 150      ANTIBIOTICS:      All available historical data has been reviewed    ASSESSMENT  61y M admitted with VIDEO EEG [id number] G40.909        PROBLEMS

## 2021-12-14 NOTE — PROGRESS NOTE ADULT - SUBJECTIVE AND OBJECTIVE BOX
Epilepsy Attending Note:     CARA YEUNG    61y Male  MRN [id number]    Vital Signs Last 24 Hrs  T(C): 36.3 (14 Dec 2021 05:31), Max: 37.4 (13 Dec 2021 14:47)  T(F): 97.4 (14 Dec 2021 05:31), Max: 99.3 (13 Dec 2021 14:47)  HR: 56 (14 Dec 2021 05:31) (55 - 56)  BP: 133/73 (14 Dec 2021 05:31) (133/73 - 143/68)  BP(mean): --  RR: 16 (14 Dec 2021 05:31) (16 - 16)  SpO2: --                          14.7   9.95  )-----------( 205      ( 13 Dec 2021 19:30 )             45.7       12-13    142  |  108  |  18  ----------------------------<  119<H>  4.1   |  19  |  1.3    Ca    9.7      13 Dec 2021 19:30  Mg     2.1     12-13    TPro  6.7  /  Alb  4.4  /  TBili  0.3  /  DBili  x   /  AST  18  /  ALT  25  /  AlkPhos  92  12-13      MEDICATIONS  (STANDING):  apixaban 5 milliGRAM(s) Oral two times a day  atorvastatin 10 milliGRAM(s) Oral at bedtime  chlorhexidine 4% Liquid 1 Application(s) Topical <User Schedule>  enalapril 5 milliGRAM(s) Oral two times a day  metoprolol succinate  milliGRAM(s) Oral daily  topiramate 150 milliGRAM(s) Oral every 12 hours    MEDICATIONS  (PRN):  acetaminophen     Tablet .. 650 milliGRAM(s) Oral every 6 hours PRN Temp greater or equal to 38C (100.4F), Mild Pain (1 - 3)  LORazepam   Injectable 2 milliGRAM(s) IV Push two times a day PRN generalized tonic-clonic seizure lasting longer than 2 minutes, or two consecutive seizures without return to baseline in-between  ondansetron Injectable 4 milliGRAM(s) IV Push every 8 hours PRN Nausea and/or Vomiting  traMADol 50 milliGRAM(s) Oral every 6 hours PRN Moderate Pain (4 - 6)            VEEG in the last 24 hours:    Background---- continues, well organized reaching frequencies in the range of 8-9 hz    Focal and generalized slowing----------------- no focal and generalized slowing    Interictal activity--  rare right FT sharp waves and low amplitude spike     Events-----none    Seizures------none    Impression:   abnormal as above    Plan - the results were discussed with him. will continue the monitoring          Will decrease the Topamax to 150 bid. seizure precaution

## 2021-12-15 ENCOUNTER — TRANSCRIPTION ENCOUNTER (OUTPATIENT)
Age: 61
End: 2021-12-15

## 2021-12-15 PROCEDURE — 99231 SBSQ HOSP IP/OBS SF/LOW 25: CPT

## 2021-12-15 PROCEDURE — 95720 EEG PHY/QHP EA INCR W/VEEG: CPT

## 2021-12-15 PROCEDURE — 99239 HOSP IP/OBS DSCHRG MGMT >30: CPT

## 2021-12-15 RX ORDER — TOPIRAMATE 25 MG
50 TABLET ORAL ONCE
Refills: 0 | Status: COMPLETED | OUTPATIENT
Start: 2021-12-15 | End: 2021-12-15

## 2021-12-15 RX ORDER — TOPIRAMATE 25 MG
200 TABLET ORAL EVERY 12 HOURS
Refills: 0 | Status: DISCONTINUED | OUTPATIENT
Start: 2021-12-15 | End: 2021-12-15

## 2021-12-15 RX ADMIN — APIXABAN 5 MILLIGRAM(S): 2.5 TABLET, FILM COATED ORAL at 05:50

## 2021-12-15 RX ADMIN — Medication 5 MILLIGRAM(S): at 05:50

## 2021-12-15 RX ADMIN — Medication 150 MILLIGRAM(S): at 10:05

## 2021-12-15 RX ADMIN — Medication 50 MILLIGRAM(S): at 11:01

## 2021-12-15 NOTE — DISCHARGE NOTE PROVIDER - PROVIDER TOKENS
FREE:[LAST:[Dr. Treadwell],PHONE:[(   )    -],FAX:[(   )    -],ADDRESS:[neurology],FOLLOWUP:[2 weeks]]

## 2021-12-15 NOTE — DISCHARGE NOTE NURSING/CASE MANAGEMENT/SOCIAL WORK - NSDCPEFALRISK_GEN_ALL_CORE
For information on Fall & Injury Prevention, visit: [email or web address] OR  [email or web address] OR  https://www.cdc.gov/steadi/patient.html

## 2021-12-15 NOTE — DISCHARGE NOTE PROVIDER - NSDCCPCAREPLAN_GEN_ALL_CORE_FT
PRINCIPAL DISCHARGE DIAGNOSIS  Diagnosis: Seizure  Assessment and Plan of Treatment: you are being discharged on Topamax 200mg q12hrs (pre-admission dose)  Further dose adjustments to be done as outpatient based on level.  Topamax level trough was sent on admission, result still pending.  Follow up with primary neurologist Dr. Treadwell in 2-3 weeks.   The importance of regular sleep pattern and use of C-PAP reinforced by neurology team.

## 2021-12-15 NOTE — DISCHARGE NOTE NURSING/CASE MANAGEMENT/SOCIAL WORK - PATIENT PORTAL LINK FT
You can access the FollowMyHealth Patient Portal offered by [facility] by registering at the following website: [email or web address]. By joining [facility]’s FollowMyHealth portal, you will also be able to view your health information using other applications (apps) compatible with our system.

## 2021-12-15 NOTE — PROGRESS NOTE ADULT - SUBJECTIVE AND OBJECTIVE BOX
Epilepsy Attending Note:     CARA YEUNG    61y Male  MRN [id number]    Vital Signs Last 24 Hrs  T(C): 36.4 (14 Dec 2021 21:09), Max: 36.6 (14 Dec 2021 13:22)  T(F): 97.6 (14 Dec 2021 21:09), Max: 97.9 (14 Dec 2021 13:22)  HR: 56 (14 Dec 2021 21:09) (56 - 65)  BP: 148/78 (14 Dec 2021 21:09) (127/72 - 148/78)  BP(mean): --  RR: 16 (14 Dec 2021 21:09) (16 - 16)  SpO2: --                          14.7   9.95  )-----------( 205      ( 13 Dec 2021 19:30 )             45.7       12-13    142  |  108  |  18  ----------------------------<  119<H>  4.1   |  19  |  1.3    Ca    9.7      13 Dec 2021 19:30  Mg     2.1     12-13    TPro  6.7  /  Alb  4.4  /  TBili  0.3  /  DBili  x   /  AST  18  /  ALT  25  /  AlkPhos  92  12-13      MEDICATIONS  (STANDING):  apixaban 5 milliGRAM(s) Oral two times a day  atorvastatin 10 milliGRAM(s) Oral at bedtime  chlorhexidine 4% Liquid 1 Application(s) Topical <User Schedule>  enalapril 5 milliGRAM(s) Oral two times a day  metoprolol succinate  milliGRAM(s) Oral daily  topiramate 200 milliGRAM(s) Oral every 12 hours    MEDICATIONS  (PRN):  acetaminophen     Tablet .. 650 milliGRAM(s) Oral every 6 hours PRN Temp greater or equal to 38C (100.4F), Mild Pain (1 - 3)  LORazepam   Injectable 2 milliGRAM(s) IV Push two times a day PRN generalized tonic-clonic seizure lasting longer than 2 minutes, or two consecutive seizures without return to baseline in-between  ondansetron Injectable 4 milliGRAM(s) IV Push every 8 hours PRN Nausea and/or Vomiting  traMADol 50 milliGRAM(s) Oral every 6 hours PRN Moderate Pain (4 - 6)            VEEG in the last 24 hours:    Background-----------  continues, symmetrical, well organized reaching frequencies in the range of 8-9 hz    Focal and generalized slowing----------   none    Interictal activity-----rare right FT sharp and low amplitude spike    Events-----none    Seizures----------none    Impression:  abnormal as above    Plan - discussed with the patient. DC on Topamax 200 bid. The level is pending. Need to have a F/U with his neurologist. His Topamax dose need to be adjused based on the lkevel          Discussed the BEVERLEY and its correlation with seizure disorder and als weight..

## 2021-12-15 NOTE — DISCHARGE NOTE PROVIDER - NSDCFUSCHEDAPPT_GEN_ALL_CORE_FT
CARA YEUNG ; 01/06/2022 ; [facility] [address] [facility] [place] ; 01/25/2022 ; [facility] [facility] [place] ; 03/03/2022 ; [facility] [address] [place] [address] [place]

## 2021-12-15 NOTE — DISCHARGE NOTE PROVIDER - NSDCFUADDINST_GEN_ALL_CORE_FT
Avoid driving, avoid operating machinery, avoid working in heights, unsupervised swimming, avoid sharp objects, avoid hot water, caution with cooking, no seizure provoking OTC stimulants till cleared by neurologist.

## 2021-12-15 NOTE — DISCHARGE NOTE PROVIDER - NSDCMRMEDTOKEN_GEN_ALL_CORE_FT
diazePAM 2 mg oral tablet: 1  orally once a day, As Needed  Eliquis 5 mg oral tablet: 1 tab(s) orally 2 times a day  enalapril 5 mg oral tablet: 1 tab(s) orally once a day  metoprolol succinate 100 mg oral tablet, extended release: 1 tab(s) orally once a day  pravastatin 20 mg oral tablet: 1 tab(s) orally once a day  topiramate 100 mg oral tablet: 2 tab(s) orally 2 times a day  traMADol 50 mg oral tablet: 1 tab(s) orally every 6 hours, As Needed

## 2021-12-15 NOTE — DISCHARGE NOTE PROVIDER - HOSPITAL COURSE
61M w/PMHx of AFIB on DOAC, HTN, HLD, obesity, BEVERLEY (CPAP), recurrent syncope sent for elective V-EEG admission, admitted to medicine service for further management.  Pt was monitored for 48 hours. Today VEEG monitoring completed, patient is cleared for discharge from neurology standpoint.  He will be discharged on Topamax 200mg q12hrs (pre-admission dose).  Further dose adjustments to be done as outpatient based on level.  Topamax level trough was sent on admission, result still pending.  Pt will follow up with primary neurologist Dr. Treadwell in 2-3 weeks.          61M w/PMHx of AFIB on DOAC, HTN, HLD, obesity, BEVERLEY (CPAP), recurrent syncope sent for elective V-EEG admission, admitted to medicine service for further management.  Pt was monitored for 48 hours. Today VEEG monitoring completed, patient is cleared for discharge from neurology standpoint.  He will be discharged on Topamax 200mg q12hrs (pre-admission dose).  Further dose adjustments to be done as outpatient based on level.  Topamax level trough was sent on admission, result still pending.  Pt will follow up with primary neurologist Dr. Treadwell in 2-3 weeks.     patient was seen and examined today  feels better.  Offers no complaints  wants to go home            Constitutional: No fever, fatigue or weight loss.  Skin: No rash.  Eyes: No recent vision problems or eye pain.  ENT: No congestion, ear pain, or sore throat.  Endocrine: No thyroid problems.  Cardiovascular: No chest pain or palpation.  Respiratory: No cough, shortness of breath, congestion, or wheezing.  Gastrointestinal: No abdominal pain, nausea, vomiting, or diarrhea.  Genitourinary: No dysuria.  Musculoskeletal: No joint swelling.  Neurologic: No headache.      Vital Signs Last 24 Hrs  T(C): 36.4 (12-14-21 @ 21:09), Max: 36.6 (12-14-21 @ 13:22)  T(F): 97.6 (12-14-21 @ 21:09), Max: 97.9 (12-14-21 @ 13:22)  HR: 56 (12-14-21 @ 21:09) (56 - 65)  BP: 148/78 (12-14-21 @ 21:09) (127/72 - 148/78)  BP(mean): --  RR: 16 (12-14-21 @ 21:09) (16 - 16)  SpO2: --        PHYSICAL EXAM-  GENERAL: NAD, well-groomed, well-developed  HEAD:  Atraumatic, Normocephalic  EYES: EOMI, PERRLA, conjunctiva and sclera clear  NECK: Supple, No JVD, Normal thyroid  NERVOUS SYSTEM:  Alert & Oriented X3, Motor Strength 5/5 B/L upper and lower extremities; DTRs 2+ intact and symmetric  CHEST/LUNG: Clear to percussion bilaterally; No rales, rhonchi, wheezing, or rubs  HEART: Regular rate and rhythm; No murmurs, rubs, or gallops  ABDOMEN: Soft, Nontender, Nondistended; Bowel sounds present  EXTREMITIES:  2+ Peripheral Pulses, No clubbing, cyanosis, or edema  SKIN: No rashes or lesions      Hospital course, and discharge planning were discussed with patient,   in details.  all questions were answered.  seems to understand, and in agreement.  time 70 min

## 2021-12-15 NOTE — PROGRESS NOTE ADULT - SUBJECTIVE AND OBJECTIVE BOX
Epilepsy Team Discharge Note:    VEEG monitoring completed, patient is cleared for discharge from neurology standpoint.    Follow up with primary neurologist Dr. Treadwell in 2-3 weeks.     Discharge seizure medications are:  Topamax 200mg q12hrs (pre-admission dose)    Topamax level trough was sent on admission, result still pending.    The importance of regular sleep pattern and use of C-PAP reinforced.  Recommended to see a nutritionist for weight loss.    Detailed instructions regarding follow up plan are given to the patient, all questions answered. Patient verbalized understanding.    Discussed with medical and nursing teams.   Epilepsy Team Discharge Note:    VEEG monitoring completed, patient is cleared for discharge from neurology standpoint.    Follow up with primary neurologist Dr. Treadwell in 2-3 weeks.     Discharge seizure medications are:  Topamax 200mg q12hrs (pre-admission dose)  Further dose adjustments to be done as outpatient based on level.  Topamax level trough was sent on admission, result still pending.    The importance of regular sleep pattern and use of C-PAP reinforced.  Recommended to see a nutritionist for weight loss.    Detailed instructions regarding follow up plan are given to the patient, all questions answered. Patient verbalized understanding.    Discussed with medical and nursing teams.

## 2021-12-16 LAB — TOPIRAMATE SERPL-MCNC: 11.7 MCG/ML — SIGNIFICANT CHANGE UP

## 2021-12-20 DIAGNOSIS — I10 ESSENTIAL (PRIMARY) HYPERTENSION: ICD-10-CM

## 2021-12-20 DIAGNOSIS — G47.33 OBSTRUCTIVE SLEEP APNEA (ADULT) (PEDIATRIC): ICD-10-CM

## 2021-12-20 DIAGNOSIS — E66.9 OBESITY, UNSPECIFIED: ICD-10-CM

## 2021-12-20 DIAGNOSIS — F17.210 NICOTINE DEPENDENCE, CIGARETTES, UNCOMPLICATED: ICD-10-CM

## 2021-12-20 DIAGNOSIS — Z79.01 LONG TERM (CURRENT) USE OF ANTICOAGULANTS: ICD-10-CM

## 2021-12-20 DIAGNOSIS — F41.9 ANXIETY DISORDER, UNSPECIFIED: ICD-10-CM

## 2021-12-20 DIAGNOSIS — G89.29 OTHER CHRONIC PAIN: ICD-10-CM

## 2021-12-20 DIAGNOSIS — I48.20 CHRONIC ATRIAL FIBRILLATION, UNSPECIFIED: ICD-10-CM

## 2021-12-20 DIAGNOSIS — M54.50 LOW BACK PAIN, UNSPECIFIED: ICD-10-CM

## 2021-12-20 DIAGNOSIS — I42.9 CARDIOMYOPATHY, UNSPECIFIED: ICD-10-CM

## 2021-12-20 DIAGNOSIS — R55 SYNCOPE AND COLLAPSE: ICD-10-CM

## 2021-12-20 DIAGNOSIS — I25.10 ATHEROSCLEROTIC HEART DISEASE OF NATIVE CORONARY ARTERY WITHOUT ANGINA PECTORIS: ICD-10-CM

## 2021-12-20 DIAGNOSIS — R56.9 UNSPECIFIED CONVULSIONS: ICD-10-CM

## 2021-12-20 DIAGNOSIS — Z99.89 DEPENDENCE ON OTHER ENABLING MACHINES AND DEVICES: ICD-10-CM

## 2022-01-05 ENCOUNTER — OUTPATIENT (OUTPATIENT)
Dept: OUTPATIENT SERVICES | Facility: HOSPITAL | Age: 62
LOS: 1 days | Discharge: HOME | End: 2022-01-05
Payer: MEDICARE

## 2022-01-05 VITALS
SYSTOLIC BLOOD PRESSURE: 138 MMHG | HEIGHT: 67 IN | RESPIRATION RATE: 18 BRPM | WEIGHT: 300.05 LBS | OXYGEN SATURATION: 100 % | TEMPERATURE: 98 F | HEART RATE: 52 BPM | DIASTOLIC BLOOD PRESSURE: 78 MMHG

## 2022-01-05 DIAGNOSIS — Z01.818 ENCOUNTER FOR OTHER PREPROCEDURAL EXAMINATION: ICD-10-CM

## 2022-01-05 DIAGNOSIS — Z98.890 OTHER SPECIFIED POSTPROCEDURAL STATES: Chronic | ICD-10-CM

## 2022-01-05 DIAGNOSIS — I48.0 PAROXYSMAL ATRIAL FIBRILLATION: ICD-10-CM

## 2022-01-05 LAB
ALBUMIN SERPL ELPH-MCNC: 4.3 G/DL — SIGNIFICANT CHANGE UP (ref 3.5–5.2)
ALP SERPL-CCNC: 90 U/L — SIGNIFICANT CHANGE UP (ref 30–115)
ALT FLD-CCNC: 22 U/L — SIGNIFICANT CHANGE UP (ref 0–41)
ANION GAP SERPL CALC-SCNC: 16 MMOL/L — HIGH (ref 7–14)
APTT BLD: 48.2 SEC — HIGH (ref 27–39.2)
AST SERPL-CCNC: 14 U/L — SIGNIFICANT CHANGE UP (ref 0–41)
BASOPHILS # BLD AUTO: 0.06 K/UL — SIGNIFICANT CHANGE UP (ref 0–0.2)
BASOPHILS NFR BLD AUTO: 0.7 % — SIGNIFICANT CHANGE UP (ref 0–1)
BILIRUB SERPL-MCNC: 0.2 MG/DL — SIGNIFICANT CHANGE UP (ref 0.2–1.2)
BUN SERPL-MCNC: 17 MG/DL — SIGNIFICANT CHANGE UP (ref 10–20)
CALCIUM SERPL-MCNC: 8.8 MG/DL — SIGNIFICANT CHANGE UP (ref 8.5–10.1)
CHLORIDE SERPL-SCNC: 108 MMOL/L — SIGNIFICANT CHANGE UP (ref 98–110)
CO2 SERPL-SCNC: 18 MMOL/L — SIGNIFICANT CHANGE UP (ref 17–32)
CREAT SERPL-MCNC: 1 MG/DL — SIGNIFICANT CHANGE UP (ref 0.7–1.5)
EOSINOPHIL # BLD AUTO: 0.06 K/UL — SIGNIFICANT CHANGE UP (ref 0–0.7)
EOSINOPHIL NFR BLD AUTO: 0.7 % — SIGNIFICANT CHANGE UP (ref 0–8)
GLUCOSE SERPL-MCNC: 78 MG/DL — SIGNIFICANT CHANGE UP (ref 70–99)
HCT VFR BLD CALC: 46.7 % — SIGNIFICANT CHANGE UP (ref 42–52)
HGB BLD-MCNC: 15.3 G/DL — SIGNIFICANT CHANGE UP (ref 14–18)
IMM GRANULOCYTES NFR BLD AUTO: 0.4 % — HIGH (ref 0.1–0.3)
INR BLD: 1.04 RATIO — SIGNIFICANT CHANGE UP (ref 0.65–1.3)
LYMPHOCYTES # BLD AUTO: 2.55 K/UL — SIGNIFICANT CHANGE UP (ref 1.2–3.4)
LYMPHOCYTES # BLD AUTO: 27.9 % — SIGNIFICANT CHANGE UP (ref 20.5–51.1)
MCHC RBC-ENTMCNC: 31.5 PG — HIGH (ref 27–31)
MCHC RBC-ENTMCNC: 32.8 G/DL — SIGNIFICANT CHANGE UP (ref 32–37)
MCV RBC AUTO: 96.1 FL — HIGH (ref 80–94)
MONOCYTES # BLD AUTO: 0.78 K/UL — HIGH (ref 0.1–0.6)
MONOCYTES NFR BLD AUTO: 8.5 % — SIGNIFICANT CHANGE UP (ref 1.7–9.3)
NEUTROPHILS # BLD AUTO: 5.65 K/UL — SIGNIFICANT CHANGE UP (ref 1.4–6.5)
NEUTROPHILS NFR BLD AUTO: 61.8 % — SIGNIFICANT CHANGE UP (ref 42.2–75.2)
NRBC # BLD: 0 /100 WBCS — SIGNIFICANT CHANGE UP (ref 0–0)
PLATELET # BLD AUTO: 185 K/UL — SIGNIFICANT CHANGE UP (ref 130–400)
POTASSIUM SERPL-MCNC: 4.2 MMOL/L — SIGNIFICANT CHANGE UP (ref 3.5–5)
POTASSIUM SERPL-SCNC: 4.2 MMOL/L — SIGNIFICANT CHANGE UP (ref 3.5–5)
PROT SERPL-MCNC: 6.8 G/DL — SIGNIFICANT CHANGE UP (ref 6–8)
PROTHROM AB SERPL-ACNC: 11.9 SEC — SIGNIFICANT CHANGE UP (ref 9.95–12.87)
RBC # BLD: 4.86 M/UL — SIGNIFICANT CHANGE UP (ref 4.7–6.1)
RBC # FLD: 14.3 % — SIGNIFICANT CHANGE UP (ref 11.5–14.5)
SODIUM SERPL-SCNC: 142 MMOL/L — SIGNIFICANT CHANGE UP (ref 135–146)
WBC # BLD: 9.14 K/UL — SIGNIFICANT CHANGE UP (ref 4.8–10.8)
WBC # FLD AUTO: 9.14 K/UL — SIGNIFICANT CHANGE UP (ref 4.8–10.8)

## 2022-01-05 PROCEDURE — 93010 ELECTROCARDIOGRAM REPORT: CPT

## 2022-01-05 RX ORDER — APIXABAN 2.5 MG/1
1 TABLET, FILM COATED ORAL
Qty: 0 | Refills: 0 | DISCHARGE

## 2022-01-05 RX ORDER — DIAZEPAM 5 MG
1 TABLET ORAL
Qty: 0 | Refills: 0 | DISCHARGE

## 2022-01-05 RX ORDER — TRAMADOL HYDROCHLORIDE 50 MG/1
1 TABLET ORAL
Qty: 0 | Refills: 0 | DISCHARGE

## 2022-01-05 RX ORDER — METOPROLOL TARTRATE 50 MG
1 TABLET ORAL
Qty: 0 | Refills: 0 | DISCHARGE

## 2022-01-05 RX ORDER — TOPIRAMATE 25 MG
2 TABLET ORAL
Qty: 0 | Refills: 0 | DISCHARGE

## 2022-01-05 NOTE — H&P PST ADULT - NSICDXFAMILYHX_GEN_ALL_CORE_FT
FAMILY HISTORY:  Mother  Still living? Yes, Estimated age: Age Unknown  FH: dementia, Age at diagnosis: Age Unknown

## 2022-01-05 NOTE — H&P PST ADULT - REASON FOR ADMISSION
62 Y/O MALE HERE FOR PRE-ADMISSION SURGICAL TESTING. PATIENT REPORTS HE WAS DIAGNOSED WITH AFIB 4/2020. HAD A LOOP RECORDER PLACED. DR BARDALES WANTS HIM TO HAVE AN AFIB ABLATION., AS HE'S GOING IN AND OUT. HE HAD A SYNCOPAL EPISODE RECENTLY. THEY ARE ALSO RULING OUT SEIZURES AS WELL.  NOW FOR SCHEDULED PROCEDURE.

## 2022-01-05 NOTE — H&P PST ADULT - NSICDXPASTMEDICALHX_GEN_ALL_CORE_FT
PAST MEDICAL HISTORY:  Afib 4/2020    Anxiety     Back pain HERNIATED DISCS    H/O syncope 12/13/21    High cholesterol     History of loop recorder 4/2020    Morbid obesity BMI=47    BEVERLEY treated with BiPAP     Seizures X2 (?? BEING WORKED UP)

## 2022-01-05 NOTE — H&P PST ADULT - HISTORY OF PRESENT ILLNESS
PATIENT DENIES CHEST PAIN, SHORTNESS OF BREATH, PALPITATIONS, COUGHING, FEVER, DYSURIA.  CAN WALK UP 2-3 FLIGHTS OF STEPS WITHOUT SOB.    NO COUGH, FEVER, SORE THROAT, HEADACHE, LOSS OF TASTE OR SMELL. NO KNOWN EXPOSURE TO ANYONE WITH COVID. PATIENT WAS INSTRUCTED TO ISOLATE FROM NOW UNTIL THE SURGERY.  NOT VACCINATED.    Anesthesia Alert  + Difficult Airway (CLASS IV)  NO--History of neck surgery or radiation  NO--Limited ROM of neck  NO--History of Malignant hyperthermia  NO--Personal or family history of Pseudocholinesterase deficiency  NO--Prior Anesthesia Complication  NO--Latex Allergy  NO--Loose teeth-UPPER DENTURE  NO--History of Rheumatoid Arthritis  + BEVERLEY (BIPAP)  + BLEEDING RISK. ON ELIQUIS

## 2022-01-06 ENCOUNTER — APPOINTMENT (OUTPATIENT)
Dept: CARDIOLOGY | Facility: CLINIC | Age: 62
End: 2022-01-06
Payer: MEDICARE

## 2022-01-06 ENCOUNTER — NON-APPOINTMENT (OUTPATIENT)
Age: 62
End: 2022-01-06

## 2022-01-06 PROCEDURE — G2066: CPT

## 2022-01-06 PROCEDURE — 93298 REM INTERROG DEV EVAL SCRMS: CPT

## 2022-01-07 NOTE — PROCEDURE
[Voltage: ___ volts] : Voltage was [unfilled] volts [Normal] : The battery status is normal. [None] : none [Counters Reset] : the counters were reset [No] : not [See Device Printout] : See device printout [de-identified] : DANIELE [de-identified] : LNQ11 [de-identified] : [id number] [de-identified] : 4/29/2021 [de-identified] : BATTERY GOOD  [de-identified] : Turned off  AT detection\par confirmed PAF longest episode for 46 sec on 11/3\par \par BURDEN 5.5%

## 2022-01-07 NOTE — PROCEDURE
[Voltage: ___ volts] : Voltage was [unfilled] volts [Normal] : The battery status is normal. [None] : none [Counters Reset] : the counters were reset [No] : not [See Device Printout] : See device printout [de-identified] : DANIELE [de-identified] : LNQ11 [de-identified] : [id number] [de-identified] : 4/29/2021 [de-identified] : BATTERY GOOD  [de-identified] : Turned off  AT detection\par confirmed PAF longest episode for 46 sec on 11/3\par \par BURDEN 5.5%
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
## 2022-01-07 NOTE — END OF VISIT
[FreeTextEntry3] : I was present with the NP/PA during the history and exam of the patient. I discussed patient's management with her in details.I reviewed the NP’s note and agree with the documented findings and plan of care. I would like to take this opportunity to thank you for involving me in this patient care. Please do not hesitate to contact me if you have any further questions at [phone].\par  [Time Spent: ___ minutes] : I have spent [unfilled] minutes of time on the encounter.

## 2022-01-07 NOTE — END OF VISIT
[FreeTextEntry3] : I was present with the NP/PA during the history and exam of the patient. I discussed patient's management with her in details.I reviewed the NP’s note and agree with the documented findings and plan of care. I would like to take this opportunity to thank you for involving me in this patient care. Please do not hesitate to contact me if you have any further questions at [phone].\par  [Time Spent: ___ minutes] : I have spent [unfilled] minutes of time on the encounter.
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
## 2022-01-11 PROBLEM — I48.91 UNSPECIFIED ATRIAL FIBRILLATION: Chronic | Status: ACTIVE | Noted: 2021-12-03

## 2022-01-11 PROBLEM — Z98.890 OTHER SPECIFIED POSTPROCEDURAL STATES: Chronic | Status: ACTIVE | Noted: 2022-01-05

## 2022-01-11 PROBLEM — M54.9 DORSALGIA, UNSPECIFIED: Chronic | Status: ACTIVE | Noted: 2020-12-19

## 2022-01-11 PROBLEM — Z87.898 PERSONAL HISTORY OF OTHER SPECIFIED CONDITIONS: Chronic | Status: ACTIVE | Noted: 2022-01-05

## 2022-01-11 PROBLEM — F41.9 ANXIETY DISORDER, UNSPECIFIED: Chronic | Status: ACTIVE | Noted: 2022-01-05

## 2022-01-11 PROBLEM — R56.9 UNSPECIFIED CONVULSIONS: Chronic | Status: ACTIVE | Noted: 2022-01-05

## 2022-01-11 PROBLEM — G47.33 OBSTRUCTIVE SLEEP APNEA (ADULT) (PEDIATRIC): Chronic | Status: ACTIVE | Noted: 2022-01-05

## 2022-01-11 PROBLEM — E66.01 MORBID (SEVERE) OBESITY DUE TO EXCESS CALORIES: Chronic | Status: ACTIVE | Noted: 2022-01-05

## 2022-01-15 ENCOUNTER — LABORATORY RESULT (OUTPATIENT)
Age: 62
End: 2022-01-15

## 2022-01-19 ENCOUNTER — TRANSCRIPTION ENCOUNTER (OUTPATIENT)
Age: 62
End: 2022-01-19

## 2022-01-19 ENCOUNTER — INPATIENT (INPATIENT)
Facility: HOSPITAL | Age: 62
LOS: 0 days | Discharge: HOME | End: 2022-01-19
Attending: STUDENT IN AN ORGANIZED HEALTH CARE EDUCATION/TRAINING PROGRAM | Admitting: STUDENT IN AN ORGANIZED HEALTH CARE EDUCATION/TRAINING PROGRAM
Payer: MEDICARE

## 2022-01-19 VITALS
HEART RATE: 83 BPM | RESPIRATION RATE: 19 BRPM | SYSTOLIC BLOOD PRESSURE: 137 MMHG | TEMPERATURE: 98 F | OXYGEN SATURATION: 98 % | DIASTOLIC BLOOD PRESSURE: 85 MMHG

## 2022-01-19 VITALS
HEART RATE: 86 BPM | SYSTOLIC BLOOD PRESSURE: 131 MMHG | DIASTOLIC BLOOD PRESSURE: 60 MMHG | RESPIRATION RATE: 22 BRPM | TEMPERATURE: 97 F | OXYGEN SATURATION: 97 %

## 2022-01-19 DIAGNOSIS — I48.0 PAROXYSMAL ATRIAL FIBRILLATION: ICD-10-CM

## 2022-01-19 DIAGNOSIS — M54.9 DORSALGIA, UNSPECIFIED: ICD-10-CM

## 2022-01-19 DIAGNOSIS — R55 SYNCOPE AND COLLAPSE: ICD-10-CM

## 2022-01-19 DIAGNOSIS — E78.5 HYPERLIPIDEMIA, UNSPECIFIED: ICD-10-CM

## 2022-01-19 DIAGNOSIS — G89.29 OTHER CHRONIC PAIN: ICD-10-CM

## 2022-01-19 DIAGNOSIS — Z98.890 OTHER SPECIFIED POSTPROCEDURAL STATES: Chronic | ICD-10-CM

## 2022-01-19 DIAGNOSIS — M51.27 OTHER INTERVERTEBRAL DISC DISPLACEMENT, LUMBOSACRAL REGION: ICD-10-CM

## 2022-01-19 DIAGNOSIS — Z79.891 LONG TERM (CURRENT) USE OF OPIATE ANALGESIC: ICD-10-CM

## 2022-01-19 DIAGNOSIS — G40.909 EPILEPSY, UNSPECIFIED, NOT INTRACTABLE, WITHOUT STATUS EPILEPTICUS: ICD-10-CM

## 2022-01-19 DIAGNOSIS — Z79.01 LONG TERM (CURRENT) USE OF ANTICOAGULANTS: ICD-10-CM

## 2022-01-19 DIAGNOSIS — I49.1 ATRIAL PREMATURE DEPOLARIZATION: ICD-10-CM

## 2022-01-19 DIAGNOSIS — I48.92 UNSPECIFIED ATRIAL FLUTTER: ICD-10-CM

## 2022-01-19 DIAGNOSIS — F17.210 NICOTINE DEPENDENCE, CIGARETTES, UNCOMPLICATED: ICD-10-CM

## 2022-01-19 DIAGNOSIS — I42.9 CARDIOMYOPATHY, UNSPECIFIED: ICD-10-CM

## 2022-01-19 DIAGNOSIS — F41.9 ANXIETY DISORDER, UNSPECIFIED: ICD-10-CM

## 2022-01-19 DIAGNOSIS — I10 ESSENTIAL (PRIMARY) HYPERTENSION: ICD-10-CM

## 2022-01-19 DIAGNOSIS — G47.33 OBSTRUCTIVE SLEEP APNEA (ADULT) (PEDIATRIC): ICD-10-CM

## 2022-01-19 DIAGNOSIS — Z95.818 PRESENCE OF OTHER CARDIAC IMPLANTS AND GRAFTS: ICD-10-CM

## 2022-01-19 DIAGNOSIS — E66.01 MORBID (SEVERE) OBESITY DUE TO EXCESS CALORIES: ICD-10-CM

## 2022-01-19 DIAGNOSIS — Z99.89 DEPENDENCE ON OTHER ENABLING MACHINES AND DEVICES: ICD-10-CM

## 2022-01-19 PROCEDURE — 93306 TTE W/DOPPLER COMPLETE: CPT | Mod: 26

## 2022-01-19 PROCEDURE — 93325 DOPPLER ECHO COLOR FLOW MAPG: CPT | Mod: 26,59

## 2022-01-19 PROCEDURE — 93655 ICAR CATH ABLTJ DSCRT ARRHYT: CPT

## 2022-01-19 PROCEDURE — 93662 INTRACARDIAC ECG (ICE): CPT | Mod: 26

## 2022-01-19 PROCEDURE — 93623 PRGRMD STIMJ&PACG IV RX NFS: CPT | Mod: 26

## 2022-01-19 PROCEDURE — 93320 DOPPLER ECHO COMPLETE: CPT | Mod: 26,59

## 2022-01-19 PROCEDURE — 99239 HOSP IP/OBS DSCHRG MGMT >30: CPT

## 2022-01-19 PROCEDURE — 93613 INTRACARDIAC EPHYS 3D MAPG: CPT

## 2022-01-19 PROCEDURE — 93656 COMPRE EP EVAL ABLTJ ATR FIB: CPT

## 2022-01-19 PROCEDURE — 93312 ECHO TRANSESOPHAGEAL: CPT | Mod: 26

## 2022-01-19 RX ORDER — FAMOTIDINE 10 MG/ML
1 INJECTION INTRAVENOUS
Qty: 30 | Refills: 0
Start: 2022-01-19 | End: 2022-02-17

## 2022-01-19 RX ORDER — FAMOTIDINE 10 MG/ML
40 INJECTION INTRAVENOUS DAILY
Refills: 0 | Status: DISCONTINUED | OUTPATIENT
Start: 2022-01-20 | End: 2022-01-19

## 2022-01-19 RX ORDER — TOPIRAMATE 25 MG
100 TABLET ORAL
Refills: 0 | Status: DISCONTINUED | OUTPATIENT
Start: 2022-01-19 | End: 2022-01-19

## 2022-01-19 RX ORDER — APIXABAN 2.5 MG/1
5 TABLET, FILM COATED ORAL EVERY 12 HOURS
Refills: 0 | Status: DISCONTINUED | OUTPATIENT
Start: 2022-01-19 | End: 2022-01-19

## 2022-01-19 RX ORDER — FAMOTIDINE 10 MG/ML
20 INJECTION INTRAVENOUS EVERY 12 HOURS
Refills: 0 | Status: COMPLETED | OUTPATIENT
Start: 2022-01-19 | End: 2022-01-19

## 2022-01-19 RX ORDER — METOPROLOL TARTRATE 50 MG
100 TABLET ORAL DAILY
Refills: 0 | Status: DISCONTINUED | OUTPATIENT
Start: 2022-01-19 | End: 2022-01-19

## 2022-01-19 RX ORDER — ATORVASTATIN CALCIUM 80 MG/1
10 TABLET, FILM COATED ORAL AT BEDTIME
Refills: 0 | Status: DISCONTINUED | OUTPATIENT
Start: 2022-01-19 | End: 2022-01-19

## 2022-01-19 RX ORDER — DIAZEPAM 5 MG
2 TABLET ORAL AT BEDTIME
Refills: 0 | Status: DISCONTINUED | OUTPATIENT
Start: 2022-01-19 | End: 2022-01-19

## 2022-01-19 RX ORDER — TRAMADOL HYDROCHLORIDE 50 MG/1
50 TABLET ORAL EVERY 6 HOURS
Refills: 0 | Status: DISCONTINUED | OUTPATIENT
Start: 2022-01-19 | End: 2022-01-19

## 2022-01-19 RX ADMIN — FAMOTIDINE 20 MILLIGRAM(S): 10 INJECTION INTRAVENOUS at 14:01

## 2022-01-19 RX ADMIN — Medication 100 MILLIGRAM(S): at 17:36

## 2022-01-19 RX ADMIN — APIXABAN 5 MILLIGRAM(S): 2.5 TABLET, FILM COATED ORAL at 21:58

## 2022-01-19 RX ADMIN — ATORVASTATIN CALCIUM 10 MILLIGRAM(S): 80 TABLET, FILM COATED ORAL at 21:58

## 2022-01-19 RX ADMIN — TRAMADOL HYDROCHLORIDE 50 MILLIGRAM(S): 50 TABLET ORAL at 18:03

## 2022-01-19 RX ADMIN — APIXABAN 5 MILLIGRAM(S): 2.5 TABLET, FILM COATED ORAL at 14:00

## 2022-01-19 RX ADMIN — TRAMADOL HYDROCHLORIDE 50 MILLIGRAM(S): 50 TABLET ORAL at 16:37

## 2022-01-19 RX ADMIN — FAMOTIDINE 20 MILLIGRAM(S): 10 INJECTION INTRAVENOUS at 17:36

## 2022-01-19 NOTE — DISCHARGE NOTE NURSING/CASE MANAGEMENT/SOCIAL WORK - NSDCPEFALRISK_GEN_ALL_CORE
For information on Fall & Injury Prevention, visit: [email or web address] OR  [email or web address] OR  https://www.cdc.gov/steadi/patient.html

## 2022-01-19 NOTE — DISCHARGE NOTE PROVIDER - NSDCFUSCHEDAPPT_GEN_ALL_CORE_FT
CARA YEUNG ; 01/25/2022 ; P [facility] [address] CARA [place] ; 02/10/2022 ; [facility] [address] CARA [place] ; 03/03/2022 ; NPP [facility] [address] [place] [address] CARA [place] ; 03/21/2022 ; [facility] [address]

## 2022-01-19 NOTE — DISCHARGE NOTE PROVIDER - HOSPITAL COURSE
Patient came to the ED with Paroxysmal Afib, pt underwent Rf ablation on 1/19/22 with Dr. Benntet. Patient to be discharged home and will followup with Dr. Bennett outpatient 3/21. Patient came to the ED with Paroxysmal Afib, pt underwent Rf ablation on 1/19/22 with Dr. Bennett. Pt in NSR with frequent PAC. Patient to be discharged home and will followup with Dr. Bennett outpatient 3/21.

## 2022-01-19 NOTE — DISCHARGE NOTE NURSING/CASE MANAGEMENT/SOCIAL WORK - PATIENT PORTAL LINK FT
You can access the FollowMyHealth Patient Portal offered by [facility] by registering at the following website: [email or web address]. By joining [employer]’s FollowMyHealth portal, you will also be able to view your health information using other applications (apps) compatible with our system.

## 2022-01-19 NOTE — DISCHARGE NOTE PROVIDER - ATTENDING DISCHARGE PHYSICAL EXAMINATION:
Patient seen and examined. Pertinent labs, imaging and telemetry reviewed. I agree with the above.     Patient post procedure with some mild bleeding post suture removal. Pt neurovascularly intact otherwise.   Continue to monitor groin. If at 1830 no further bleeding, will ambulate patient and if no further bleeding, will discharge home.     Patient to follow up with Dr. Bennett as outpatient.

## 2022-01-19 NOTE — CHART NOTE - NSCHARTNOTEFT_GEN_A_CORE
Electrophysiology Brief Post-Op Note      I have personally seen and examined the patient.  I agree with the history and physical which I have reviewed and noted any changes below.    PRE-OP DIAGNOSIS: Atrial Fibrillation    POST-OP DIAGNOSIS: Atrial Fibrillation    PROCEDURE:  -Transeophageal Echocardiogram  -Transeptal Puncture  -3D Mapping  -Use of intracardiac echocardiography  -Pulmonary Veins Isolation (Cryoablation)  -RF ablation of CTI isthmus      Vascular Access used (using Ultrasound Guidance)  -Right Femoral Vein: 14F  -Left Femoral Vein: 11F 7F 6F  -Right Femoral Artery: none    All sheaths and wires removed and Perclose along with figure 8 suture applied in both groins.    Physician: Sabrina  Assistant: None    ANESTHESIA TYPE:  [X]General Anesthesia  [  ] Sedation  [  ] Local/Regional      CONDITION  [  ] Critical  [  ] Serious  [  ]Fair  [X]Good      SPECIMENS REMOVED (IF APPLICABLE): NONE      IMPLANTS (IF APPLICABLE): NONE      FINDINGS (see below)  -Successful Pulmonary Veins Isolation with entrance and exit block  -Successful RF ablation of CTI isthmus  -No pericardial effusion on ICE  -ESTIMATED BLOOD LOSS:  15 mL  -CONTRAST USED: 9 cc      PLAN OF CARE  -	Start Eliquis 5 mg q12 today (continuous schedule)  -	Start pepcid 20 mg daily tonight  -	Bed rest overnight  -	Admit to telemetry

## 2022-01-19 NOTE — CHART NOTE - NSCHARTNOTEFT_GEN_A_CORE
INTERVAL HPI/OVERNIGHT EVENTS:    Patient s/p    AF         ablation.   Patient in NSR with frequent PAC    EXTREMITIES:  2+ Peripheral Pulses, No clubbing, cyanosis, or edema  groins No hematoma, no bleeding; stiches removed      Patient S/P     Ablation  Patient is doing well  - continue Eliquis   -  Pepcid 40 mg daily x 30 days  - No heavy lifting or exertional activities for 10days  - Can take a shower tomorrow, no bathtub for 10days, do not submerge yourself in water  - FU in EP office with Dr Bennett 3/21 @1:30pm   [address] [place] [address], Suite [facility]  [phone]

## 2022-01-19 NOTE — DISCHARGE NOTE PROVIDER - CARE PROVIDER_API CALL
Wilner Bennett)  Cardiac Electrophysiology; Cardiovascular Disease; [facility] Medicine  [address] [place] [address]  Phone: [phone]  Fax: [phone]  Established Patient  Scheduled Appointment: 03/21/2022 01:30 PM

## 2022-01-19 NOTE — DISCHARGE NOTE PROVIDER - NSDCMRMEDTOKEN_GEN_ALL_CORE_FT
diazePAM 2 mg oral tablet: 1  orally once a day, As Needed  Eliquis 5 mg oral tablet: 1 tab(s) orally 2 times a day  enalapril 5 mg oral tablet: 1 tab(s) orally once a day  famotidine 40 mg oral tablet: 1 tab(s) orally once a day (at bedtime)  metoprolol succinate 100 mg oral tablet, extended release: 1 tab(s) orally once a day  pravastatin 20 mg oral tablet: 1 tab(s) orally once a day  topiramate 100 mg oral tablet: 2 tab(s) orally 2 times a day  traMADol 50 mg oral tablet: 1 tab(s) orally every 6 hours, As Needed

## 2022-01-19 NOTE — DISCHARGE NOTE PROVIDER - PROVIDER TOKENS
PROVIDER:[TOKEN:[[id number]],SCHEDULEDAPPT:[03/21/2022],SCHEDULEDAPPTTIME:[01:30 PM],ESTABLISHEDPATIENT:[T]]

## 2022-01-19 NOTE — DISCHARGE NOTE PROVIDER - NSDCFUADDINST_GEN_ALL_CORE_FT
- Continue Eliquis   - Pepcid 40 mg daily x 30 days  - No heavy lifting or exertional activities for 10days  - Can take a shower tomorrow, no bathtub for 10days, do not submerge yourself in water

## 2022-01-25 ENCOUNTER — APPOINTMENT (OUTPATIENT)
Dept: CARDIOLOGY | Facility: CLINIC | Age: 62
End: 2022-01-25

## 2022-02-10 ENCOUNTER — NON-APPOINTMENT (OUTPATIENT)
Age: 62
End: 2022-02-10

## 2022-02-10 ENCOUNTER — APPOINTMENT (OUTPATIENT)
Dept: CARDIOLOGY | Facility: CLINIC | Age: 62
End: 2022-02-10
Payer: MEDICARE

## 2022-02-10 PROCEDURE — 93298 REM INTERROG DEV EVAL SCRMS: CPT

## 2022-02-10 PROCEDURE — G2066: CPT

## 2022-03-03 ENCOUNTER — APPOINTMENT (OUTPATIENT)
Age: 62
End: 2022-03-03
Payer: MEDICARE

## 2022-03-03 VITALS
WEIGHT: 297 LBS | BODY MASS INDEX: 46.62 KG/M2 | OXYGEN SATURATION: 97 % | HEIGHT: 67 IN | DIASTOLIC BLOOD PRESSURE: 84 MMHG | HEART RATE: 63 BPM | SYSTOLIC BLOOD PRESSURE: 126 MMHG | RESPIRATION RATE: 14 BRPM

## 2022-03-03 PROCEDURE — 99214 OFFICE O/P EST MOD 30 MIN: CPT

## 2022-03-03 NOTE — REASON FOR VISIT
[Follow-Up] : a follow-up visit [Sleep Apnea] : sleep apnea [Obesity] : obesity [TextBox_44] : doing well.  Usually followed by [facility]

## 2022-03-03 NOTE — ASSESSMENT
[FreeTextEntry1] : Assessment:\par BEVERLEY\par Obesity\par \par PLAN:\par The patient is benefitting from the PAP device .\par New supplies ordered \par Weight loss discussed\par I stressed the need maintain compliance  with the PAP device.\par The patient is not to use an Ozone or UV sterilizer. \par F/U in 12 months\par \par Patient is a heavy smoker.  He was advised not to smoke.  I had a long discussion regarding screening CAT scans.  He is refusing at this point saying he is not ready for this.  He is having trouble at home with his mother.  We will see him again in the fall and will bring up the screening CAT scan again at that point.He needs a PFT again we will pursue this in the fall

## 2022-04-01 ENCOUNTER — APPOINTMENT (OUTPATIENT)
Dept: CARDIOLOGY | Facility: CLINIC | Age: 62
End: 2022-04-01
Payer: MEDICARE

## 2022-04-01 VITALS
HEART RATE: 53 BPM | DIASTOLIC BLOOD PRESSURE: 80 MMHG | HEIGHT: 67 IN | TEMPERATURE: 97.1 F | SYSTOLIC BLOOD PRESSURE: 130 MMHG | WEIGHT: 290 LBS | BODY MASS INDEX: 45.52 KG/M2

## 2022-04-01 DIAGNOSIS — I25.10 ATHEROSCLEROTIC HEART DISEASE OF NATIVE CORONARY ARTERY W/OUT ANGINA PECTORIS: ICD-10-CM

## 2022-04-01 DIAGNOSIS — E78.5 HYPERLIPIDEMIA, UNSPECIFIED: ICD-10-CM

## 2022-04-01 DIAGNOSIS — I48.91 UNSPECIFIED ATRIAL FIBRILLATION: ICD-10-CM

## 2022-04-01 DIAGNOSIS — I25.9 CHRONIC ISCHEMIC HEART DISEASE, UNSPECIFIED: ICD-10-CM

## 2022-04-01 PROCEDURE — 99214 OFFICE O/P EST MOD 30 MIN: CPT

## 2022-04-01 PROCEDURE — 93000 ELECTROCARDIOGRAM COMPLETE: CPT

## 2022-04-01 RX ORDER — DIAZEPAM 2 MG/1
2 TABLET ORAL
Qty: 30 | Refills: 0 | Status: DISCONTINUED | COMMUNITY
Start: 2020-11-19 | End: 2022-04-01

## 2022-04-01 NOTE — PHYSICAL EXAM
[General Appearance - Well Developed] : well developed [Normal Appearance] : normal appearance [Well Groomed] : well groomed [General Appearance - Well Nourished] : well nourished [No Deformities] : no deformities [General Appearance - In No Acute Distress] : no acute distress [Normal Oral Mucosa] : normal oral mucosa [Normal Jugular Venous V Waves Present] : normal jugular venous V waves present [Normal Jugular Venous A Waves Present] : normal jugular venous A waves present [No Jugular Venous Cyr A Waves] : no jugular venous cyr A waves [Respiration, Rhythm And Depth] : normal respiratory rhythm and effort [Exaggerated Use Of Accessory Muscles For Inspiration] : no accessory muscle use [Auscultation Breath Sounds / Voice Sounds] : lungs were clear to auscultation bilaterally [Heart Rate And Rhythm] : heart rate and rhythm were normal [Heart Sounds] : normal S1 and S2 [Murmurs] : no murmurs present [Abdomen Soft] : soft [Abdomen Tenderness] : non-tender [Abdomen Mass (___ Cm)] : no abdominal mass palpated [Nail Clubbing] : no clubbing of the fingernails [Cyanosis, Localized] : no localized cyanosis [Petechial Hemorrhages (___cm)] : no petechial hemorrhages [] : no ischemic changes [Skin Color & Pigmentation] : normal skin color and pigmentation [Oriented To Time, Place, And Person] : oriented to person, place, and time
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
## 2022-04-01 NOTE — ASSESSMENT
[FreeTextEntry1] : 63 yo male with pmhx and presentation as above \par cad, ccs class 2\par chf, nyha class 2\par a.fib, s/p rfa\par cont a/c \par ep/neuro f/ups noted\par labs reviewed\par weight reduction and smoking cessation addressed\par long term risks addressed\par diet and act as tolerated\par aggressive risk modif\par rtc 6 months

## 2022-04-11 ENCOUNTER — APPOINTMENT (OUTPATIENT)
Dept: CARDIOLOGY | Facility: CLINIC | Age: 62
End: 2022-04-11

## 2022-04-18 ENCOUNTER — NON-APPOINTMENT (OUTPATIENT)
Age: 62
End: 2022-04-18

## 2022-04-22 ENCOUNTER — NON-APPOINTMENT (OUTPATIENT)
Age: 62
End: 2022-04-22

## 2022-04-22 ENCOUNTER — APPOINTMENT (OUTPATIENT)
Dept: CARDIOLOGY | Facility: CLINIC | Age: 62
End: 2022-04-22
Payer: MEDICARE

## 2022-04-22 PROCEDURE — 93298 REM INTERROG DEV EVAL SCRMS: CPT

## 2022-04-22 PROCEDURE — G2066: CPT

## 2022-05-23 ENCOUNTER — APPOINTMENT (OUTPATIENT)
Dept: CARDIOLOGY | Facility: CLINIC | Age: 62
End: 2022-05-23

## 2022-06-24 ENCOUNTER — APPOINTMENT (OUTPATIENT)
Dept: CARDIOLOGY | Facility: CLINIC | Age: 62
End: 2022-06-24
Payer: MEDICARE

## 2022-06-24 ENCOUNTER — NON-APPOINTMENT (OUTPATIENT)
Age: 62
End: 2022-06-24

## 2022-06-24 PROCEDURE — 93298 REM INTERROG DEV EVAL SCRMS: CPT

## 2022-06-24 PROCEDURE — G2066: CPT

## 2022-07-29 ENCOUNTER — NON-APPOINTMENT (OUTPATIENT)
Age: 62
End: 2022-07-29

## 2022-07-29 ENCOUNTER — APPOINTMENT (OUTPATIENT)
Dept: CARDIOLOGY | Facility: CLINIC | Age: 62
End: 2022-07-29

## 2022-07-29 PROCEDURE — 93298 REM INTERROG DEV EVAL SCRMS: CPT

## 2022-07-29 PROCEDURE — G2066: CPT

## 2022-09-02 ENCOUNTER — APPOINTMENT (OUTPATIENT)
Dept: CARDIOLOGY | Facility: CLINIC | Age: 62
End: 2022-09-02

## 2022-09-02 ENCOUNTER — NON-APPOINTMENT (OUTPATIENT)
Age: 62
End: 2022-09-02

## 2022-09-02 PROCEDURE — 93298 REM INTERROG DEV EVAL SCRMS: CPT

## 2022-09-02 PROCEDURE — G2066: CPT

## 2022-10-07 ENCOUNTER — APPOINTMENT (OUTPATIENT)
Dept: CARDIOLOGY | Facility: CLINIC | Age: 62
End: 2022-10-07

## 2022-10-07 ENCOUNTER — NON-APPOINTMENT (OUTPATIENT)
Age: 62
End: 2022-10-07

## 2022-10-07 PROCEDURE — 93298 REM INTERROG DEV EVAL SCRMS: CPT

## 2022-10-07 PROCEDURE — G2066: CPT

## 2022-10-11 ENCOUNTER — APPOINTMENT (OUTPATIENT)
Dept: NEUROLOGY | Facility: CLINIC | Age: 62
End: 2022-10-11

## 2022-10-11 VITALS
BODY MASS INDEX: 45.52 KG/M2 | WEIGHT: 290 LBS | HEART RATE: 79 BPM | HEIGHT: 67 IN | SYSTOLIC BLOOD PRESSURE: 154 MMHG | DIASTOLIC BLOOD PRESSURE: 92 MMHG

## 2022-10-11 PROCEDURE — 99213 OFFICE O/P EST LOW 20 MIN: CPT

## 2022-10-11 NOTE — HISTORY OF PRESENT ILLNESS
[FreeTextEntry1] : Mr. CARA YEUNG returns to the office for follow-up and his prior history and physical have been reviewed and he reports no change since last visit.  he has been seeing us for his seizures and has been seizure free on the combination of lamictal 100mg BID and topiramate 200mg BID since last July.  he started to drive again sparingly now.  his blood work done through his medical doctor yearly has been fine including his liver enzymes\par \par of note, his mother is in a nursing home and enjoy having company there.\par

## 2022-10-21 ENCOUNTER — APPOINTMENT (OUTPATIENT)
Dept: CARDIOLOGY | Facility: CLINIC | Age: 62
End: 2022-10-21

## 2022-11-11 ENCOUNTER — NON-APPOINTMENT (OUTPATIENT)
Age: 62
End: 2022-11-11

## 2022-11-11 ENCOUNTER — APPOINTMENT (OUTPATIENT)
Dept: CARDIOLOGY | Facility: CLINIC | Age: 62
End: 2022-11-11

## 2022-11-11 PROCEDURE — 93298 REM INTERROG DEV EVAL SCRMS: CPT

## 2022-11-11 PROCEDURE — G2066: CPT

## 2022-11-28 ENCOUNTER — APPOINTMENT (OUTPATIENT)
Dept: CARDIOLOGY | Facility: CLINIC | Age: 62
End: 2022-11-28
Payer: MEDICARE

## 2022-11-28 DIAGNOSIS — Z87.891 PERSONAL HISTORY OF NICOTINE DEPENDENCE: ICD-10-CM

## 2022-11-28 DIAGNOSIS — I10 ESSENTIAL (PRIMARY) HYPERTENSION: ICD-10-CM

## 2022-11-28 PROCEDURE — 93000 ELECTROCARDIOGRAM COMPLETE: CPT

## 2022-11-28 PROCEDURE — 99213 OFFICE O/P EST LOW 20 MIN: CPT

## 2022-11-28 RX ORDER — ALBUTEROL SULFATE 90 UG/1
108 (90 BASE) AEROSOL, METERED RESPIRATORY (INHALATION)
Refills: 0 | Status: COMPLETED | COMMUNITY
Start: 2017-05-25 | End: 2022-11-28

## 2022-11-28 RX ORDER — ATORVASTATIN CALCIUM 10 MG/1
10 TABLET, FILM COATED ORAL
Refills: 0 | Status: COMPLETED | COMMUNITY
End: 2022-11-28

## 2022-12-20 ENCOUNTER — APPOINTMENT (OUTPATIENT)
Dept: PULMONOLOGY | Facility: CLINIC | Age: 62
End: 2022-12-20

## 2022-12-20 VITALS
BODY MASS INDEX: 46.3 KG/M2 | HEART RATE: 68 BPM | HEIGHT: 67 IN | DIASTOLIC BLOOD PRESSURE: 82 MMHG | SYSTOLIC BLOOD PRESSURE: 130 MMHG | OXYGEN SATURATION: 99 % | WEIGHT: 295 LBS

## 2022-12-20 DIAGNOSIS — E66.9 OBESITY, UNSPECIFIED: ICD-10-CM

## 2022-12-20 PROCEDURE — 99214 OFFICE O/P EST MOD 30 MIN: CPT

## 2022-12-20 NOTE — ASSESSMENT
[FreeTextEntry1] : Assessment:\par BEVERLEY\par Obesity\par \par PLAN:\par I will arrange for a compliance report to see if there is any residual sleep apnea.\par He needs to get a new prescription on his CPAP mask.\par The patient is benefitting from the PAP device .\par New supplies ordered \par Weight loss discussed\par I stressed the need maintain compliance  with the PAP device.\par The patient is not to use an Ozone or UV sterilizer. \par F/U in 6months\par \par \par Even if the patient does not feel fully refreshed is in his best interest to continue using the CPAP to avoid further cardiac issues\par

## 2022-12-23 ENCOUNTER — EMERGENCY (EMERGENCY)
Facility: HOSPITAL | Age: 62
LOS: 0 days | Discharge: HOME | End: 2022-12-23
Attending: EMERGENCY MEDICINE | Admitting: EMERGENCY MEDICINE

## 2022-12-23 VITALS
SYSTOLIC BLOOD PRESSURE: 145 MMHG | DIASTOLIC BLOOD PRESSURE: 96 MMHG | TEMPERATURE: 98 F | RESPIRATION RATE: 18 BRPM | OXYGEN SATURATION: 100 % | HEART RATE: 98 BPM

## 2022-12-23 VITALS — WEIGHT: 289.91 LBS

## 2022-12-23 DIAGNOSIS — E78.00 PURE HYPERCHOLESTEROLEMIA, UNSPECIFIED: ICD-10-CM

## 2022-12-23 DIAGNOSIS — I48.0 PAROXYSMAL ATRIAL FIBRILLATION: ICD-10-CM

## 2022-12-23 DIAGNOSIS — F41.9 ANXIETY DISORDER, UNSPECIFIED: ICD-10-CM

## 2022-12-23 DIAGNOSIS — Z98.890 OTHER SPECIFIED POSTPROCEDURAL STATES: Chronic | ICD-10-CM

## 2022-12-23 DIAGNOSIS — I10 ESSENTIAL (PRIMARY) HYPERTENSION: ICD-10-CM

## 2022-12-23 DIAGNOSIS — R21 RASH AND OTHER NONSPECIFIC SKIN ERUPTION: ICD-10-CM

## 2022-12-23 DIAGNOSIS — G47.33 OBSTRUCTIVE SLEEP APNEA (ADULT) (PEDIATRIC): ICD-10-CM

## 2022-12-23 DIAGNOSIS — Z79.01 LONG TERM (CURRENT) USE OF ANTICOAGULANTS: ICD-10-CM

## 2022-12-23 PROCEDURE — 99284 EMERGENCY DEPT VISIT MOD MDM: CPT

## 2022-12-23 RX ORDER — DIPHENHYDRAMINE HCL 50 MG
50 CAPSULE ORAL ONCE
Refills: 0 | Status: COMPLETED | OUTPATIENT
Start: 2022-12-23 | End: 2022-12-23

## 2022-12-23 RX ORDER — EPINEPHRINE 0.3 MG/.3ML
0.3 INJECTION INTRAMUSCULAR; SUBCUTANEOUS
Qty: 1 | Refills: 0
Start: 2022-12-23

## 2022-12-23 RX ADMIN — Medication 50 MILLIGRAM(S): at 16:13

## 2022-12-23 NOTE — ED PROVIDER NOTE - NSFOLLOWUPINSTRUCTIONS_ED_ALL_ED_FT
Follow up with PMD and Dermatology in 1-2 days.    Rash    A rash is a change in the color of the skin. A rash can also change the way your skin feels. There are many different conditions and factors that can cause a rash, most of which are not dangerous. Make sure to follow up with your primary care physician or a dermatologist as instructed by your health care provider.    SEEK IMMEDIATE MEDICAL CARE IF YOU HAVE ANY OF THE FOLLOWING SYMPTOMS: fever, blisters, a rash inside your mouth, vaginal or anal pain, or altered mental status.

## 2022-12-23 NOTE — ED PROVIDER NOTE - PHYSICAL EXAMINATION
CONST: Well appearing in NAD  EYES: PERRL, EOMI, Sclera and conjunctiva clear.   ENT: No nasal discharge. Oropharynx normal appearing, no erythema or exudates. No abscess or swelling. Uvula midline.   NECK: Non-tender, no meningeal signs. normal ROM. supple   CARD: S1 S2; No jvd  RESP: Equal BS B/L, No wheezes, rhonchi or rales. No distress  GI: Soft, non-tender, non-distended. no cva tenderness. normal BS  MS: Normal ROM in all extremities. pulses 2 +. no calf tenderness or swelling  SKIN: Erythematous urticaria rash to trunk and extremities  NEURO: A&Ox3, No focal deficits. Strength 5/5 with no sensory deficits.

## 2022-12-23 NOTE — ED PROVIDER NOTE - PATIENT PORTAL LINK FT
You can access the FollowMyHealth Patient Portal offered by [facility] by registering at the following website: [email or web address]. By joining [employer]’s FollowMyHealth portal, you will also be able to view your health information using other applications (apps) compatible with our system.

## 2022-12-23 NOTE — ED PROVIDER NOTE - ATTENDING APP SHARED VISIT CONTRIBUTION OF CARE
62-year-old male with past medical history of parries axonal atrial fibrillation status post evaluation on DOAC, high blood pressure, hyperlipidemia, obstructive sleep apnea on CPAP, presents for rash that started 3 days ago.  Patient reports he noticed it on an extremity and has spread now to his trunk and back associated with pruritus.  Has been taking Benadryl which he reports has helped with the rash.  Patient reports he is on enalapril but states has never had any issues with this and no oral involvement.  pt not on any abx, no new detergents, not around weeds, no new pets or foods, no new factors he cant think of. No bug bites. denies fever, chills, n/v, cp, sob, pleuritic chest pain, palpitations, diaphoresis, cough, drooling/secretions, trismus, neck swelling, neck stiffness, muffled/change in voice, dysphagia, odynophagia, drainage, trauma, weakness, numbness/tingling, abd pain, diarrhea, constipation, urinary symptoms, ha/lh/dizziness, involvement of palms or soles, gu lesions, history of sti's, sick contacts, recent travel. took benadryl last night with relief.     on exam: non toxic appearing  pt sitting on stretcher speaking full sentences, erythematous rash to trunk, back, arms and legs, papules  in nature, wheals noted on trunk, no involvement of rash to mucous membranes, palms or soles, no petechiae, (-) Nikolsky sign, no bleeding, no target lesions, d. no central clearing, no oropharyngeal edema, uvula midline, no neck swelling- supple, non-tender, RRR, radial pulses 2/4 b/l, ctabl w/ breath sounds present b/l, no wheezing or crackles, no accessory muscle use, no tachypnea, no stridor, bs present throughout all 4 quadrants, soft ntnd, no r/g, no hepatolienomegaly, AAOx3. Cn II-XII intact, No focal deficits.    Plan: bendaryl, steroid and epi pen sent to pharmacy, reassess./

## 2022-12-23 NOTE — ED PROVIDER NOTE - OBJECTIVE STATEMENT
62-year-old male past medical history on Eliquis, hypertension, hyperlipidemia, BEVERLEY presents for evaluation of rash.  Patient developed mildly pruritic rash that started on his right lower extremity x3 days ago and since has spread to his other extremities and trunk, improved with Benadryl, unknown inciting factor.  Denies fever, headache, chest pain, shortness of breath, abdominal pain, dysuria, hematuria, numbness, weakness, mucosal involvement.

## 2022-12-23 NOTE — ED PROVIDER NOTE - CARE PROVIDER_API CALL
Rocky Patel)  Dermatology; Internal Medicine  [address] [place] [address]  Phone: [phone]  Fax: [phone]  Follow Up Time:

## 2022-12-23 NOTE — ED PROVIDER NOTE - CLINICAL SUMMARY MEDICAL DECISION MAKING FREE TEXT BOX
62-year-old male with past medical history of parries axonal atrial fibrillation status post evaluation pn DOAC, high blood pressure, hyperlipidemia, obstructive sleep apnea on CPAP, presents for rash that started 3 days ago.  Patient reports he noticed it on an extremity and has spread now to his trunk and back associated with pruritus.  Has been taking Benadryl which he reports has helped with the rash.  Patient reports he is on enalapril but states has never had any issues with this and no oral involvement.  Extensive conversation had with patient about avoiding any triggers he could think of, discussion with his primary care doctor regarding enalapril.  Patient reports he will discuss with his primary care doctor regarding this medication.  Medication sent to pharmacy including an epinephrine pen.  Patient aware of when and how to use this.  Steroids sent to pharmacy.  Patient comfortable with plan.  Reports feeling better at this time.  Aware signs and symptoms to return for.  will follow-up.

## 2022-12-23 NOTE — ED PROVIDER NOTE - NS ED ATTENDING STATEMENT MOD
This was a shared visit with the KISHA. I reviewed and verified the documentation and independently performed the documented:

## 2022-12-23 NOTE — ED PROVIDER NOTE - PROGRESS NOTE DETAILS
ED Attending MONI Grier  Patient is a good candidate to attempt outpatient management. Supportive care and home care discussed in detail. Patient aware they may have to return for re-evaluation and possible admission if outpatient treatment fails. Strict return precautions discussed.

## 2022-12-29 ENCOUNTER — NON-APPOINTMENT (OUTPATIENT)
Age: 62
End: 2022-12-29

## 2022-12-29 ENCOUNTER — APPOINTMENT (OUTPATIENT)
Dept: CARDIOLOGY | Facility: CLINIC | Age: 62
End: 2022-12-29
Payer: MEDICARE

## 2022-12-29 PROCEDURE — 93298 REM INTERROG DEV EVAL SCRMS: CPT

## 2022-12-29 PROCEDURE — G2066: CPT

## 2022-12-30 ENCOUNTER — TRANSCRIPTION ENCOUNTER (OUTPATIENT)
Age: 62
End: 2022-12-30

## 2023-01-31 ENCOUNTER — NON-APPOINTMENT (OUTPATIENT)
Age: 63
End: 2023-01-31

## 2023-01-31 ENCOUNTER — APPOINTMENT (OUTPATIENT)
Dept: CARDIOLOGY | Facility: CLINIC | Age: 63
End: 2023-01-31
Payer: MEDICARE

## 2023-01-31 PROCEDURE — G2066: CPT

## 2023-01-31 PROCEDURE — 93298 REM INTERROG DEV EVAL SCRMS: CPT

## 2023-02-14 NOTE — ASU DISCHARGE PLAN (ADULT/PEDIATRIC) - DISCHARGE TO
Home Perilesional Excision Additional Text (Leave Blank If You Do Not Want): The margin was drawn around the clinically apparent lesion. Incisions were then made along these lines to the appropriate tissue plane and the lesion was extirpated.

## 2023-02-15 NOTE — PROCEDURE
[No] : not [See Device Printout] : See device printout [de-identified] : DANIELE [de-identified] : [id number] [de-identified] : [id number] [de-identified] : 4/29/2021 [de-identified] : 10 episodes of "AF" vs sinus with APCs and PVCs\par AF detection changed to least sensitive and to episodes >60 minutes\par 2 tachy episodes from 1/19/2022 were during patient's ablation (EPS)

## 2023-02-15 NOTE — END OF VISIT
[Time Spent: ___ minutes] : I have spent [unfilled] minutes of time on the encounter. [FreeTextEntry3] : I was present with the NP/PA during the history and exam of the patient. I discussed patient's management with her in details.I reviewed the NP’s note and agree with the documented findings and plan of care. I would like to take this opportunity to thank you for involving me in this patient care. Please do not hesitate to contact me if you have any further questions at [phone].\par
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
## 2023-02-15 NOTE — CARDIOLOGY SUMMARY
[de-identified] : 11/28/2022: sinus arrhythmia 61 bpm, nrml intervals\par ECG (11/15/2021): Not done, patient had one recently and did not want another one\par ECG ( 9/28/2021) 63bpm sinus with PAC's \par ECG (7/8/2021): Sinus arrhythmia at 62 bpm, LAD, normal MD, QRS, QTc   \par ECG (1/12/2021): sinus rhythm at 61 bpm, non specific ST/T abnormalities [de-identified] : Echo (1/5/2021): EF 55-60%, LV mildly dilated, mild AI, mild to moderate TR

## 2023-02-15 NOTE — DISCUSSION/SUMMARY
[FreeTextEntry1] : Mr. Leandro Haley is a pleasant 62 year-old man with cardiomyopathy, improved EF, BEVERLEY on CPAP, hypertension, hyperlipidemia, and recurrent syncope. \par Etiology of syncope unclear; could be related to bradyarrhythmias, sleep apnea, or neurological. \par He underwent an ILR on 4/29/2021 for long term arrhythmia monitoring. \par He is following with a neurologist Dr. oMntes; as per patient he had MRI and EEG. \par He underwent PVI with cryoablation and CTI flutter ablation on 1/19/2022.  \par \par Loop recorder interrogation today revealed : 10 AF episodes likely NSR with APCs and PVCs vs. PAF, some are difficult to discern due to noise. He is doing well on Eliquis - no s/s bleeding reported - CBC, CMP ordered.\par \par He is doing well since the catheter ablation. His groin has healed well with no s/s hematoma, infection or bleeding. I recommend to continue ILR monitoring. \par \par The patient will follow up with me in 12 months. \par \par

## 2023-03-03 ENCOUNTER — APPOINTMENT (OUTPATIENT)
Dept: CARDIOLOGY | Facility: CLINIC | Age: 63
End: 2023-03-03
Payer: MEDICARE

## 2023-03-03 ENCOUNTER — NON-APPOINTMENT (OUTPATIENT)
Age: 63
End: 2023-03-03

## 2023-03-03 PROCEDURE — 93298 REM INTERROG DEV EVAL SCRMS: CPT

## 2023-03-03 PROCEDURE — G2066: CPT

## 2023-04-04 ENCOUNTER — APPOINTMENT (OUTPATIENT)
Dept: NEUROLOGY | Facility: CLINIC | Age: 63
End: 2023-04-04
Payer: MEDICARE

## 2023-04-04 VITALS
WEIGHT: 295 LBS | SYSTOLIC BLOOD PRESSURE: 152 MMHG | DIASTOLIC BLOOD PRESSURE: 101 MMHG | BODY MASS INDEX: 46.3 KG/M2 | HEIGHT: 67 IN | HEART RATE: 89 BPM

## 2023-04-04 PROCEDURE — 99213 OFFICE O/P EST LOW 20 MIN: CPT

## 2023-04-04 NOTE — HISTORY OF PRESENT ILLNESS
[FreeTextEntry1] : Mr. Nava is a 63 year old man who comes in for a follow up. He has no new complaints today and reports of feeling better to date. He feels no change with medication. He stopped taking it for 4 days with no change. \par He developed a rash throughout his body 12/2022. He went to the ER. He has not followed up with him in six months.He needs to follow up with a dermatologist. He has tried sleeping in another bed before. Nothing has not changed he says. \par \par \par \par \par \par \par \par \par \par \par Mr. YEUNG returns for follow-up and his prior history and physical have been reviewed and he reports no change since last visit.  he has been seeing us for his seizures and has been seizure free on the combination of lamictal 100 mg BID and Topiramate 200 mg BID since last July.  he started to drive again sparingly now.  his blood work done through his medical doctor yearly has been fine.

## 2023-04-06 ENCOUNTER — NON-APPOINTMENT (OUTPATIENT)
Age: 63
End: 2023-04-06

## 2023-04-06 ENCOUNTER — APPOINTMENT (OUTPATIENT)
Dept: CARDIOLOGY | Facility: CLINIC | Age: 63
End: 2023-04-06
Payer: MEDICARE

## 2023-04-06 PROCEDURE — G2066: CPT

## 2023-04-06 PROCEDURE — 93298 REM INTERROG DEV EVAL SCRMS: CPT

## 2023-05-10 ENCOUNTER — NON-APPOINTMENT (OUTPATIENT)
Age: 63
End: 2023-05-10

## 2023-05-10 ENCOUNTER — APPOINTMENT (OUTPATIENT)
Dept: CARDIOLOGY | Facility: CLINIC | Age: 63
End: 2023-05-10
Payer: MEDICARE

## 2023-05-10 PROCEDURE — 93298 REM INTERROG DEV EVAL SCRMS: CPT

## 2023-05-10 PROCEDURE — G2066: CPT

## 2023-06-14 ENCOUNTER — APPOINTMENT (OUTPATIENT)
Dept: CARDIOLOGY | Facility: CLINIC | Age: 63
End: 2023-06-14
Payer: MEDICARE

## 2023-06-14 ENCOUNTER — NON-APPOINTMENT (OUTPATIENT)
Age: 63
End: 2023-06-14

## 2023-06-14 PROCEDURE — G2066: CPT

## 2023-06-14 PROCEDURE — 93298 REM INTERROG DEV EVAL SCRMS: CPT

## 2023-07-17 ENCOUNTER — APPOINTMENT (OUTPATIENT)
Dept: CARDIOLOGY | Facility: CLINIC | Age: 63
End: 2023-07-17
Payer: MEDICARE

## 2023-07-17 ENCOUNTER — NON-APPOINTMENT (OUTPATIENT)
Age: 63
End: 2023-07-17

## 2023-07-17 PROCEDURE — G2066: CPT

## 2023-07-17 PROCEDURE — 93298 REM INTERROG DEV EVAL SCRMS: CPT

## 2023-07-18 ENCOUNTER — APPOINTMENT (OUTPATIENT)
Dept: PULMONOLOGY | Facility: CLINIC | Age: 63
End: 2023-07-18
Payer: MEDICARE

## 2023-07-18 VITALS
WEIGHT: 295 LBS | BODY MASS INDEX: 46.3 KG/M2 | OXYGEN SATURATION: 98 % | HEIGHT: 67 IN | HEART RATE: 73 BPM | DIASTOLIC BLOOD PRESSURE: 100 MMHG | SYSTOLIC BLOOD PRESSURE: 152 MMHG

## 2023-07-18 DIAGNOSIS — E66.9 OBESITY, UNSPECIFIED: ICD-10-CM

## 2023-07-18 PROCEDURE — 99214 OFFICE O/P EST MOD 30 MIN: CPT

## 2023-07-18 NOTE — REASON FOR VISIT
[Follow-Up] : a follow-up visit [Sleep Evaluation] : sleep evaluation [TextBox_44] : History of sleep apnea using the machine however he states that he does not have any symptomatic improvements.  He uses it because he knows it helps his heart.

## 2023-07-18 NOTE — ASSESSMENT
[FreeTextEntry1] : Assessment:\par BEVERLEY\par Obesity\par \par PLAN:\par I will arrange for a compliance report \par He needs to get a new prescription on his CPAP mask.\par The patient is using from the PAP device .  He needs he needs a new CPAP titration and also to look for PLMS\par New supplies ordered \par Weight loss discussed\par I stressed the need maintain compliance  with the PAP device.\par The patient is not to use an Ozone or UV sterilizer. \par F/U after testing\par \par \par Even if the patient does not feel fully refreshed is in his best interest to continue using the CPAP to avoid further cardiac issues\par

## 2023-08-11 NOTE — ED ADULT NURSE NOTE - IS THE PATIENT ABLE TO BE SCREENED?
Advised to see rheumatologist  
I spent <15 minutes face to face with individual providing recommendations for nutrition choices and exercise plan to help achieve weight reduction goals. Obesity is systemic disorder and it can bring devastating morbidities in furture. It is a matter of calorie gain and loss, keeping bodybank in negative calorie balance mode is the way to sustain weight loss.Diet has a big role in reducing excess body wt. Scheduled and well planned meals and food intake with watchfulness and understanding of calorie portion and distribution is key to understand. Bariatric surgery is another option if sustained wt loss is not achieved and faced with one or more comorbidities with morbid obesity. Weigh yourself twice a week to understand and follow wt loss goals.    
Patients BP readings reviewed and addressed, as we age our arteries turn stiffer and less elastic. Restricting salt consumption and staying physically fit with regular exercise regimen is the only way to keep our vasculature less tonic. Studies have shown that keeping ideal body wt, exercise routine about 140 to 150 minutes a week, eating variety of plant based diet and drinking plentiful water are quite helpful. Monitor BP twice or once a week at home and bring log to be reviewed by me. Uncontrolled BP has long term consequences including heart failure, myocardial infarction, accelerated atherosclerosis and kidney dysfunction. Therapy reviewed and explained.    
Pt has moderate to severe COPD. It is progressive disease, use of MDI and proper technique discussed, rinse mouth after inhaled corticosteroids. Notify if progressive dyspnea or cough or lethargy, monitor oxygen saturation if possible with home based pulse oximetry. Avoid hospitalization and call us if any flare ups are about to happen, be sure that annual flu vaccine are uptodate and review need for pneumococcal vaccine. Light to moderate low impact exercises are very helpful and deep breathing  exercises are beneficial in chronic lung diseases.    
Yes

## 2023-08-18 ENCOUNTER — NON-APPOINTMENT (OUTPATIENT)
Age: 63
End: 2023-08-18

## 2023-08-18 ENCOUNTER — APPOINTMENT (OUTPATIENT)
Dept: CARDIOLOGY | Facility: CLINIC | Age: 63
End: 2023-08-18
Payer: MEDICARE

## 2023-08-19 PROCEDURE — G2066: CPT

## 2023-08-19 PROCEDURE — 93298 REM INTERROG DEV EVAL SCRMS: CPT

## 2023-08-22 ENCOUNTER — NON-APPOINTMENT (OUTPATIENT)
Age: 63
End: 2023-08-22

## 2023-09-22 ENCOUNTER — APPOINTMENT (OUTPATIENT)
Dept: CARDIOLOGY | Facility: CLINIC | Age: 63
End: 2023-09-22
Payer: MEDICARE

## 2023-09-22 ENCOUNTER — APPOINTMENT (OUTPATIENT)
Dept: SLEEP CENTER | Facility: HOSPITAL | Age: 63
End: 2023-09-22

## 2023-09-22 ENCOUNTER — NON-APPOINTMENT (OUTPATIENT)
Age: 63
End: 2023-09-22

## 2023-09-23 PROCEDURE — G2066: CPT

## 2023-09-23 PROCEDURE — 93298 REM INTERROG DEV EVAL SCRMS: CPT

## 2023-10-10 ENCOUNTER — APPOINTMENT (OUTPATIENT)
Dept: PULMONOLOGY | Facility: CLINIC | Age: 63
End: 2023-10-10

## 2023-10-17 NOTE — ED PROVIDER NOTE - NS ED MD EM SELECTION
Left detailed message with dates, times, and location. Gave my Teams # to call back to confirm. [phone] Detailed

## 2023-10-21 ENCOUNTER — APPOINTMENT (OUTPATIENT)
Dept: SLEEP CENTER | Facility: HOSPITAL | Age: 63
End: 2023-10-21

## 2023-10-27 ENCOUNTER — NON-APPOINTMENT (OUTPATIENT)
Age: 63
End: 2023-10-27

## 2023-10-27 ENCOUNTER — APPOINTMENT (OUTPATIENT)
Dept: CARDIOLOGY | Facility: CLINIC | Age: 63
End: 2023-10-27
Payer: MEDICARE

## 2023-10-28 PROCEDURE — 93298 REM INTERROG DEV EVAL SCRMS: CPT | Mod: NC

## 2023-10-28 PROCEDURE — G2066: CPT | Mod: NC

## 2023-10-30 ENCOUNTER — NON-APPOINTMENT (OUTPATIENT)
Age: 63
End: 2023-10-30

## 2023-11-27 ENCOUNTER — APPOINTMENT (OUTPATIENT)
Dept: ELECTROPHYSIOLOGY | Facility: CLINIC | Age: 63
End: 2023-11-27

## 2023-12-05 NOTE — ASU DISCHARGE PLAN (ADULT/PEDIATRIC) - ***IN THE EVENT THAT YOU DEVELOP A COMPLICATION AND YOU ARE UNABLE TO REACH YOUR OWN PHYSICIAN, YOU MAY CONTACT:
Statement Selected STABLE. CAD s/p MIDCAB and PCI with multiple WINTER (most recent to LCx in 8/2022), Currently asymptomatic. EKGs w/o ischemic changes    c/w home med ASA81 and Lipitor 80qhs

## 2023-12-28 ENCOUNTER — APPOINTMENT (OUTPATIENT)
Dept: CARDIOLOGY | Facility: CLINIC | Age: 63
End: 2023-12-28
Payer: MEDICARE

## 2023-12-28 ENCOUNTER — NON-APPOINTMENT (OUTPATIENT)
Age: 63
End: 2023-12-28

## 2023-12-29 PROCEDURE — G2066: CPT

## 2023-12-29 PROCEDURE — 93298 REM INTERROG DEV EVAL SCRMS: CPT

## 2024-01-29 ENCOUNTER — NON-APPOINTMENT (OUTPATIENT)
Age: 64
End: 2024-01-29

## 2024-01-29 ENCOUNTER — APPOINTMENT (OUTPATIENT)
Dept: CARDIOLOGY | Facility: CLINIC | Age: 64
End: 2024-01-29
Payer: MEDICARE

## 2024-01-30 PROCEDURE — 93298 REM INTERROG DEV EVAL SCRMS: CPT

## 2024-03-04 ENCOUNTER — NON-APPOINTMENT (OUTPATIENT)
Age: 64
End: 2024-03-04

## 2024-03-04 ENCOUNTER — APPOINTMENT (OUTPATIENT)
Dept: CARDIOLOGY | Facility: CLINIC | Age: 64
End: 2024-03-04
Payer: MEDICARE

## 2024-03-04 PROCEDURE — 93298 REM INTERROG DEV EVAL SCRMS: CPT

## 2024-04-02 ENCOUNTER — APPOINTMENT (OUTPATIENT)
Dept: NEUROLOGY | Facility: CLINIC | Age: 64
End: 2024-04-02

## 2024-04-07 ENCOUNTER — NON-APPOINTMENT (OUTPATIENT)
Age: 64
End: 2024-04-07

## 2024-04-08 ENCOUNTER — APPOINTMENT (OUTPATIENT)
Dept: CARDIOLOGY | Facility: CLINIC | Age: 64
End: 2024-04-08
Payer: MEDICARE

## 2024-04-08 PROCEDURE — 93298 REM INTERROG DEV EVAL SCRMS: CPT

## 2024-05-02 ENCOUNTER — APPOINTMENT (OUTPATIENT)
Dept: NEUROLOGY | Facility: CLINIC | Age: 64
End: 2024-05-02
Payer: MEDICARE

## 2024-05-02 VITALS — BODY MASS INDEX: 44.73 KG/M2 | WEIGHT: 285 LBS | HEIGHT: 67 IN

## 2024-05-02 VITALS — SYSTOLIC BLOOD PRESSURE: 138 MMHG | HEART RATE: 73 BPM | DIASTOLIC BLOOD PRESSURE: 74 MMHG

## 2024-05-02 DIAGNOSIS — R56.9 UNSPECIFIED CONVULSIONS: ICD-10-CM

## 2024-05-02 PROCEDURE — 99214 OFFICE O/P EST MOD 30 MIN: CPT

## 2024-05-02 PROCEDURE — G2211 COMPLEX E/M VISIT ADD ON: CPT

## 2024-05-02 NOTE — ASSESSMENT
[FreeTextEntry1] : Seizure versus stress reaction - Will continue with topiramate as well as Lamictal for now - He should return for video EEG once his heart issue has been addressed  Syncope-etiology unclear - Possible vasovagal event - Follow-up with cardiology first, will decide if he needs neurological workup afterwards - Follow-up in 2-month  Total clinician time spent  is  35 minutes including preparing to see the patient, obtaining and/or reviewing and confirming history, performing a medically necessary and appropriate examination, counseling and educating the patient and/or family, documenting clinical information in the HER and communicating and/or referring to other healthcare professionals.

## 2024-05-02 NOTE — HISTORY OF PRESENT ILLNESS
[FreeTextEntry1] : Mr. CARA YEUNG returns to the office for follow-up and his prior history and physical have been reviewed and he reports no change since last visit.  He has been seeing us for complex partial seizure versus stress reaction.  All his past workup including MRI, routine EEG, 48-hour EEG were normal.  He was even sent to Dr. Silva, the epileptologist for inpatient EEG monitoring, however because of his heart issues, he never did the video EEG monitoring.  We maintain him on Lamictal as well as topiramate and he has not had these episodes while on medication.  The improvement also happened in the setting of him finally put her mother with severe dementia who no longer recognize him in the nursing home.  Recently however because of financial reasons he has to take his mother back home to live with him, and he started to notice the old episodes coming back, so far infrequent.  In the last 3 to 4-month however he had 3 episodes of passing out after he swallow food.  He does not eat any solid food during the day, and his first meal of the day usually comes at 7 or 8 PM.  With these episodes as soon as he finished chewing and swallowed the food, he felt the sensation that he felt before when given tramadol as well as morphine in the past before passing out completely.  There was no tongue biting, urinary incontinence and no post episode orientation or confusion.  These episodes were captured on security cameras outside source.  He has a loop recorder on for all these episodes, however he was never called by the the office to inquire about any cardiac events.  He has an appointment to see the cardiologist next week

## 2024-05-06 ENCOUNTER — APPOINTMENT (OUTPATIENT)
Dept: ELECTROPHYSIOLOGY | Facility: CLINIC | Age: 64
End: 2024-05-06
Payer: MEDICARE

## 2024-05-06 VITALS
WEIGHT: 285 LBS | SYSTOLIC BLOOD PRESSURE: 120 MMHG | TEMPERATURE: 97.1 F | BODY MASS INDEX: 44.73 KG/M2 | HEIGHT: 67 IN | DIASTOLIC BLOOD PRESSURE: 70 MMHG | HEART RATE: 66 BPM | RESPIRATION RATE: 18 BRPM

## 2024-05-06 DIAGNOSIS — Z45.09 ENCOUNTER FOR ADJUSTMENT AND MANAGEMENT OF OTHER CARDIAC DEVICE: ICD-10-CM

## 2024-05-06 DIAGNOSIS — R55 SYNCOPE AND COLLAPSE: ICD-10-CM

## 2024-05-06 DIAGNOSIS — Z79.01 LONG TERM (CURRENT) USE OF ANTICOAGULANTS: ICD-10-CM

## 2024-05-06 DIAGNOSIS — I48.0 PAROXYSMAL ATRIAL FIBRILLATION: ICD-10-CM

## 2024-05-06 PROCEDURE — 93000 ELECTROCARDIOGRAM COMPLETE: CPT | Mod: 59

## 2024-05-06 PROCEDURE — 99214 OFFICE O/P EST MOD 30 MIN: CPT | Mod: 25

## 2024-05-06 PROCEDURE — 93285 PRGRMG DEV EVAL SCRMS IP: CPT

## 2024-05-06 RX ORDER — TOPIRAMATE 100 MG/1
100 TABLET, FILM COATED ORAL
Qty: 120 | Refills: 0 | Status: DISCONTINUED | COMMUNITY
Start: 2021-01-02 | End: 2024-05-06

## 2024-05-06 RX ORDER — LAMOTRIGINE 100 MG/1
100 TABLET ORAL TWICE DAILY
Qty: 180 | Refills: 3 | Status: ACTIVE | COMMUNITY
Start: 2022-10-11

## 2024-05-06 RX ORDER — OMEPRAZOLE 40 MG/1
40 CAPSULE, DELAYED RELEASE ORAL DAILY
Refills: 0 | Status: ACTIVE | COMMUNITY
Start: 2017-03-28

## 2024-05-06 RX ORDER — FLUTICASONE PROPIONATE 50 UG/1
50 SPRAY, METERED NASAL
Qty: 16 | Refills: 0 | Status: ACTIVE | COMMUNITY
Start: 2020-11-21

## 2024-05-06 RX ORDER — SERTRALINE 25 MG/1
25 TABLET, FILM COATED ORAL
Qty: 90 | Refills: 0 | Status: COMPLETED | COMMUNITY
Start: 2020-07-13 | End: 2024-05-06

## 2024-05-06 RX ORDER — APIXABAN 5 MG/1
5 TABLET, FILM COATED ORAL
Qty: 180 | Refills: 3 | Status: ACTIVE | COMMUNITY
Start: 2021-11-08 | End: 1900-01-01

## 2024-05-06 RX ORDER — TOPIRAMATE 200 MG/1
200 TABLET ORAL
Qty: 360 | Refills: 3 | Status: ACTIVE | COMMUNITY
Start: 2022-10-11

## 2024-05-06 RX ORDER — TRIAMCINOLONE ACETONIDE 1 MG/G
0.1 CREAM TOPICAL
Qty: 30 | Refills: 0 | Status: ACTIVE | COMMUNITY
Start: 2020-07-30

## 2024-05-06 RX ORDER — POTASSIUM CHLORIDE 1.5 G/1.58G
20 POWDER, FOR SOLUTION ORAL
Refills: 0 | Status: DISCONTINUED | COMMUNITY
End: 2024-05-06

## 2024-05-06 RX ORDER — IBUPROFEN 800 MG/1
800 TABLET, FILM COATED ORAL
Qty: 90 | Refills: 0 | Status: ACTIVE | COMMUNITY
Start: 2021-10-07

## 2024-05-07 PROBLEM — Z79.01 ON CONTINUOUS ORAL ANTICOAGULATION: Status: ACTIVE | Noted: 2024-05-07

## 2024-05-07 PROBLEM — Z45.09 ENCOUNTER FOR LOOP RECORDER CHECK: Status: ACTIVE | Noted: 2021-11-15

## 2024-05-07 PROBLEM — I48.0 PAROXYSMAL ATRIAL FIBRILLATION: Status: ACTIVE | Noted: 2021-11-10

## 2024-05-07 PROBLEM — R55 SYNCOPE AND COLLAPSE: Status: ACTIVE | Noted: 2021-01-12

## 2024-05-07 NOTE — PROCEDURE
[No] : not [See Device Printout] : See device printout [Normal] : The battery status is normal. [Sensing Amplitude ___mv] : sensing amplitude was [unfilled] mv [None] : none [de-identified] : DANIELE [de-identified] : LNQ11 [de-identified] : [id number] [de-identified] : 4/29/2021 [de-identified] : Good [de-identified] : AF event on 3/28/24 lasting 1hr 16minutes 4 brief nocturnal Jeffery events in October 2023
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
## 2024-05-07 NOTE — PHYSICAL EXAM
[General Appearance - Well Developed] : well developed [General Appearance - Well Nourished] : well nourished [No Deformities] : no deformities [Heart Rate And Rhythm] : heart rate and rhythm were normal [Heart Sounds] : normal S1 and S2 [] : no respiratory distress [Auscultation Breath Sounds / Voice Sounds] : lungs were clear to auscultation bilaterally [Respiration, Rhythm And Depth] : normal respiratory rhythm and effort [Left Infraclavicular] : left infraclavicular area [Clean] : clean [Dry] : dry [Well-Healed] : well-healed [Bowel Sounds] : normal bowel sounds [Abdomen Soft] : soft [Nail Clubbing] : no clubbing of the fingernails [Cyanosis, Localized] : no localized cyanosis

## 2024-05-07 NOTE — END OF VISIT
[Time Spent: ___ minutes] : I have spent [unfilled] minutes of time on the encounter. [FreeTextEntry3] : I was present with the NP/PA during the history and exam of the patient. I discussed patient's management with her in details.I reviewed the NP's note and agree with the documented findings and plan of care. I would like to take this opportunity to thank you for involving me in this patient care. Please do not hesitate to contact me if you have any further questions at [phone].\par

## 2024-05-07 NOTE — CARDIOLOGY SUMMARY
[de-identified] : 05/07/2024: SR with PVCs   HR 66bpm 11/28/2022: sinus arrhythmia 61 bpm, nrml intervals ECG (11/15/2021): Not done, patient had one recently and did not want another one ECG ( 9/28/2021) 63bpm sinus with PAC's  ECG (7/8/2021): Sinus arrhythmia at 62 bpm, LAD, normal IL, QRS, QTc    ECG (1/12/2021): sinus rhythm at 61 bpm, non specific ST/T abnormalities [de-identified] : 04/29/2021: YVETTE (MDT) implanted [de-identified] : Echo (1/5/2021): EF 55-60%, LV mildly dilated, mild AI, mild to moderate TR

## 2024-05-07 NOTE — DISCUSSION/SUMMARY
[FreeTextEntry1] : Mr. Leandro Haley is a pleasant 64 year-old man with cardiomyopathy, improved EF, BEVERLEY on CPAP, hypertension, hyperlipidemia, and recurrent syncope.  Etiology of syncope unclear; could be related to bradyarrhythmias, sleep apnea, or neurological.  He underwent an ILR on 4/29/2021 for long term arrhythmia monitoring.  He is following with a neurologist Dr. Montes; as per patient he had MRI and EEG.  He underwent PVI with cryoablation and CTI flutter ablation on 1/19/2022.    He is doing well since the catheter ablation. His groin has healed well with no s/s hematoma, infection or bleeding.  Loop recorder interrogation today revealed: 4 Jeffery events in 10/2023 during sleep and 1 AF episode on 3/28/2024. He is doing well on Eliquis - no s/s bleeding reported.  Patient had a syncopal episode on 3/7/24 at 4/5pm and another episode on 5/3/24. Pt had a video from the store he was in when he syncopized and fell on his face. No events on ILR to correlate.  - Discussed with Dr. Bennett, d/t recent syncopal episodes will order Tilt-Table test to be done at [facility].   I recommend to continue ILR monitoring.   F/U 3-4 months / PRN after tilt table

## 2024-06-12 ENCOUNTER — NON-APPOINTMENT (OUTPATIENT)
Age: 64
End: 2024-06-12

## 2024-06-12 ENCOUNTER — APPOINTMENT (OUTPATIENT)
Dept: CARDIOLOGY | Facility: CLINIC | Age: 64
End: 2024-06-12
Payer: MEDICARE

## 2024-06-12 PROCEDURE — 93298 REM INTERROG DEV EVAL SCRMS: CPT

## 2024-07-01 ENCOUNTER — APPOINTMENT (OUTPATIENT)
Dept: PULMONOLOGY | Facility: CLINIC | Age: 64
End: 2024-07-01
Payer: MEDICARE

## 2024-07-01 VITALS
HEIGHT: 67 IN | SYSTOLIC BLOOD PRESSURE: 122 MMHG | DIASTOLIC BLOOD PRESSURE: 84 MMHG | OXYGEN SATURATION: 97 % | HEART RATE: 60 BPM | BODY MASS INDEX: 41.12 KG/M2 | WEIGHT: 262 LBS

## 2024-07-01 DIAGNOSIS — G47.33 OBSTRUCTIVE SLEEP APNEA (ADULT) (PEDIATRIC): ICD-10-CM

## 2024-07-01 DIAGNOSIS — E66.9 OBESITY, UNSPECIFIED: ICD-10-CM

## 2024-07-01 DIAGNOSIS — I42.0 DILATED CARDIOMYOPATHY: ICD-10-CM

## 2024-07-01 PROCEDURE — 99214 OFFICE O/P EST MOD 30 MIN: CPT

## 2024-07-01 PROCEDURE — G2211 COMPLEX E/M VISIT ADD ON: CPT

## 2024-07-17 ENCOUNTER — APPOINTMENT (OUTPATIENT)
Dept: CARDIOLOGY | Facility: CLINIC | Age: 64
End: 2024-07-17
Payer: MEDICARE

## 2024-07-17 ENCOUNTER — NON-APPOINTMENT (OUTPATIENT)
Age: 64
End: 2024-07-17

## 2024-07-17 PROCEDURE — 93298 REM INTERROG DEV EVAL SCRMS: CPT

## 2024-08-21 ENCOUNTER — NON-APPOINTMENT (OUTPATIENT)
Age: 64
End: 2024-08-21

## 2024-08-21 ENCOUNTER — APPOINTMENT (OUTPATIENT)
Dept: CARDIOLOGY | Facility: CLINIC | Age: 64
End: 2024-08-21
Payer: MEDICARE

## 2024-08-21 PROCEDURE — 93298 REM INTERROG DEV EVAL SCRMS: CPT

## 2024-09-05 ENCOUNTER — APPOINTMENT (OUTPATIENT)
Dept: NEUROLOGY | Facility: CLINIC | Age: 64
End: 2024-09-05

## 2024-09-09 ENCOUNTER — APPOINTMENT (OUTPATIENT)
Dept: ELECTROPHYSIOLOGY | Facility: CLINIC | Age: 64
End: 2024-09-09
Payer: MEDICARE

## 2024-09-09 VITALS — BODY MASS INDEX: 40.25 KG/M2 | WEIGHT: 257 LBS

## 2024-09-09 VITALS
DIASTOLIC BLOOD PRESSURE: 84 MMHG | SYSTOLIC BLOOD PRESSURE: 143 MMHG | TEMPERATURE: 97.1 F | HEIGHT: 67 IN | WEIGHT: 262 LBS | BODY MASS INDEX: 41.12 KG/M2 | HEART RATE: 81 BPM

## 2024-09-09 DIAGNOSIS — Z45.09 ENCOUNTER FOR ADJUSTMENT AND MANAGEMENT OF OTHER CARDIAC DEVICE: ICD-10-CM

## 2024-09-09 DIAGNOSIS — R55 SYNCOPE AND COLLAPSE: ICD-10-CM

## 2024-09-09 DIAGNOSIS — I48.91 UNSPECIFIED ATRIAL FIBRILLATION: ICD-10-CM

## 2024-09-09 DIAGNOSIS — Z79.01 LONG TERM (CURRENT) USE OF ANTICOAGULANTS: ICD-10-CM

## 2024-09-09 PROCEDURE — 93285 PRGRMG DEV EVAL SCRMS IP: CPT

## 2024-09-09 PROCEDURE — 99214 OFFICE O/P EST MOD 30 MIN: CPT

## 2024-09-09 RX ORDER — FLUOCINONIDE 0.05 MG/G
0.05 OINTMENT TOPICAL
Refills: 0 | Status: ACTIVE | COMMUNITY
Start: 2024-08-09

## 2024-09-09 NOTE — CARDIOLOGY SUMMARY
[de-identified] : 05/07/2024: SR with PVCs   HR 66bpm 11/28/2022: sinus arrhythmia 61 bpm, nrml intervals ECG (11/15/2021): Not done, patient had one recently and did not want another one ECG ( 9/28/2021) 63bpm sinus with PAC's  ECG (7/8/2021): Sinus arrhythmia at 62 bpm, LAD, normal WV, QRS, QTc    ECG (1/12/2021): sinus rhythm at 61 bpm, non specific ST/T abnormalities [de-identified] : 04/29/2021: YVETTE (MDT) implanted [de-identified] : Echo (1/5/2021): EF 55-60%, LV mildly dilated, mild AI, mild to moderate TR
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
## 2024-09-09 NOTE — PHYSICAL EXAM
[General Appearance - Well Developed] : well developed [General Appearance - Well Nourished] : well nourished [No Deformities] : no deformities [Heart Rate And Rhythm] : heart rate and rhythm were normal [Heart Sounds] : normal S1 and S2 [] : no respiratory distress [Respiration, Rhythm And Depth] : normal respiratory rhythm and effort [Left Infraclavicular] : left infraclavicular area [Clean] : clean [Dry] : dry [Well-Healed] : well-healed [Bowel Sounds] : normal bowel sounds [Abdomen Soft] : soft [Nail Clubbing] : no clubbing of the fingernails [Cyanosis, Localized] : no localized cyanosis

## 2024-09-09 NOTE — PROCEDURE
[No] : not [See Device Printout] : See device printout [Normal] : The battery status is normal. [Sensing Amplitude ___mv] : sensing amplitude was [unfilled] mv [None] : none [de-identified] : DANIELE [de-identified] : LNQ11 [de-identified] : [id number] [de-identified] : 4/29/2021 [de-identified] : Good [de-identified] : multiple tiana events from 8/27 - present avg HR range 31 - 59bpm (most nocturnal)

## 2024-09-26 ENCOUNTER — APPOINTMENT (OUTPATIENT)
Dept: NEUROLOGY | Facility: CLINIC | Age: 64
End: 2024-09-26

## 2024-10-14 ENCOUNTER — APPOINTMENT (OUTPATIENT)
Dept: CARDIOLOGY | Facility: CLINIC | Age: 64
End: 2024-10-14
Payer: MEDICARE

## 2024-10-14 ENCOUNTER — NON-APPOINTMENT (OUTPATIENT)
Age: 64
End: 2024-10-14

## 2024-10-14 PROCEDURE — 93298 REM INTERROG DEV EVAL SCRMS: CPT

## 2024-10-25 ENCOUNTER — RX RENEWAL (OUTPATIENT)
Age: 64
End: 2024-10-25

## 2024-11-07 ENCOUNTER — APPOINTMENT (OUTPATIENT)
Dept: NEUROLOGY | Facility: CLINIC | Age: 64
End: 2024-11-07
Payer: MEDICARE

## 2024-11-07 VITALS — HEART RATE: 86 BPM | DIASTOLIC BLOOD PRESSURE: 82 MMHG | SYSTOLIC BLOOD PRESSURE: 158 MMHG

## 2024-11-07 VITALS — WEIGHT: 252 LBS | BODY MASS INDEX: 39.55 KG/M2 | HEIGHT: 67 IN

## 2024-11-07 DIAGNOSIS — R56.9 UNSPECIFIED CONVULSIONS: ICD-10-CM

## 2024-11-07 PROCEDURE — 99214 OFFICE O/P EST MOD 30 MIN: CPT

## 2024-11-18 ENCOUNTER — APPOINTMENT (OUTPATIENT)
Dept: CARDIOLOGY | Facility: CLINIC | Age: 64
End: 2024-11-18
Payer: MEDICARE

## 2024-11-18 ENCOUNTER — NON-APPOINTMENT (OUTPATIENT)
Age: 64
End: 2024-11-18

## 2024-11-18 PROCEDURE — 93298 REM INTERROG DEV EVAL SCRMS: CPT

## 2024-12-12 ENCOUNTER — APPOINTMENT (OUTPATIENT)
Dept: SLEEP CENTER | Facility: HOSPITAL | Age: 64
End: 2024-12-12

## 2024-12-12 ENCOUNTER — OUTPATIENT (OUTPATIENT)
Dept: OUTPATIENT SERVICES | Facility: HOSPITAL | Age: 64
LOS: 1 days | Discharge: HOME | End: 2024-12-12
Payer: MEDICARE

## 2024-12-12 DIAGNOSIS — G47.33 OBSTRUCTIVE SLEEP APNEA (ADULT) (PEDIATRIC): ICD-10-CM

## 2024-12-12 DIAGNOSIS — Z98.890 OTHER SPECIFIED POSTPROCEDURAL STATES: Chronic | ICD-10-CM

## 2024-12-12 PROCEDURE — 95811 POLYSOM 6/>YRS CPAP 4/> PARM: CPT | Mod: 26

## 2024-12-12 PROCEDURE — 95811 POLYSOM 6/>YRS CPAP 4/> PARM: CPT

## 2024-12-13 NOTE — ED ADULT NURSE NOTE - NSIMPLEMENTINTERV_GEN_ALL_ED
---
Implemented All Universal Safety Interventions:  [place] to call system. Call bell, personal items and telephone within reach. Instruct patient to call for assistance. Room bathroom lighting operational. Non-slip footwear when patient is off stretcher. Physically safe environment: no spills, clutter or unnecessary equipment. Stretcher in lowest position, wheels locked, appropriate side rails in place.

## 2024-12-14 DIAGNOSIS — G47.33 OBSTRUCTIVE SLEEP APNEA (ADULT) (PEDIATRIC): ICD-10-CM

## 2024-12-23 ENCOUNTER — APPOINTMENT (OUTPATIENT)
Dept: CARDIOLOGY | Facility: CLINIC | Age: 64
End: 2024-12-23

## 2025-01-02 ENCOUNTER — APPOINTMENT (OUTPATIENT)
Dept: ELECTROPHYSIOLOGY | Facility: HOSPITAL | Age: 65
End: 2025-01-02

## 2025-01-02 ENCOUNTER — OUTPATIENT (OUTPATIENT)
Dept: OUTPATIENT SERVICES | Facility: HOSPITAL | Age: 65
LOS: 1 days | Discharge: HOME | End: 2025-01-02

## 2025-01-02 ENCOUNTER — TRANSCRIPTION ENCOUNTER (OUTPATIENT)
Age: 65
End: 2025-01-02

## 2025-01-02 VITALS
RESPIRATION RATE: 18 BRPM | HEART RATE: 78 BPM | TEMPERATURE: 99 F | DIASTOLIC BLOOD PRESSURE: 84 MMHG | SYSTOLIC BLOOD PRESSURE: 162 MMHG

## 2025-01-02 VITALS
SYSTOLIC BLOOD PRESSURE: 162 MMHG | RESPIRATION RATE: 18 BRPM | DIASTOLIC BLOOD PRESSURE: 84 MMHG | HEART RATE: 78 BPM | TEMPERATURE: 99 F

## 2025-01-02 DIAGNOSIS — Z98.890 OTHER SPECIFIED POSTPROCEDURAL STATES: Chronic | ICD-10-CM

## 2025-01-02 DIAGNOSIS — R55 SYNCOPE AND COLLAPSE: ICD-10-CM

## 2025-01-02 PROCEDURE — 33286 RMVL SUBQ CAR RHYTHM MNTR: CPT

## 2025-01-02 PROCEDURE — ZZZZZ: CPT

## 2025-01-02 RX ADMIN — Medication 500 MILLIGRAM(S): at 10:54
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
## 2025-01-02 NOTE — H&P ADULT - NSICDXPASTMEDICALHX_GEN_ALL_CORE_FT
PAST MEDICAL HISTORY:  Afib 4/2020    Anxiety     Back pain HERNIATED DISCS    H/O syncope 12/13/21    High cholesterol     History of loop recorder 4/2020    Morbid obesity BMI=47    BEVERLEY treated with BiPAP     Seizures X2 (?? BEING WORKED UP)    
Awake/Alert

## 2025-01-02 NOTE — H&P ADULT - NSHPPHYSICALEXAM_GEN_ALL_CORE
CONSTITUTIONAL: Well groomed, no apparent distress  ENMT: Oral mucosa with moist membranes.  RESP:  CTA b/l, no WRR  CV: RRR, +S1S2, no MRG; no JVD; no peripheral edema  GI: Soft, NT, ND, no rebound, no guarding; no palpable masses; no hepatosplenomegaly; no hernia palpated  MSK: Normal gait;   Normal ROM without pain, no spinal tenderness, normal muscle strength/tone  NEURO: CN II-XII intact; normal reflexes in upper and lower extremities, sensation intact in upper and lower extremities b/l to light touch   PSYCH: Appropriate insight/judgment; A+O x 3, mood and affect appropriate, recent/remote memory intact

## 2025-01-02 NOTE — ASU DISCHARGE PLAN (ADULT/PEDIATRIC) - FINANCIAL ASSISTANCE
[facility] provides services at a reduced cost to those who are determined to be eligible through [facility]’s financial assistance program. Information regarding [facility]’s financial assistance program can be found by going to [email or web address] or by calling [phone].

## 2025-01-02 NOTE — H&P ADULT - ASSESSMENT
Mr. Leandro Haley is a pleasant 64 year-old man with cardiomyopathy, improved EF, BEVERLEY on CPAP, hypertension, hyperlipidemia, and recurrent syncope. Etiology of syncope unclear; could be related to bradyarrhythmias, sleep apnea, or neurological. He underwent an ILR on 4/29/2021 for long term arrhythmia monitoring.  He is following with a neurologist Dr. Montes; as per patient he had MRI and EEG.  He underwent PVI with cryoablation and CTI flutter ablation on 1/19/2022. The most recent interrogation from the office revealed: multiple Jeffery events 8/27 - present. Most nocturnal when patient  was sleeping. Patient sleeps from about 2/3am - 10/10:30am. Patient reports multiple syncopal episodes last year. No events on ILR to correlate. Tilt-Table test done at [facility] (-).  Patient reports feeling well today and offers no complaints.   Mr. Haley presents to EP lab today for a loop recorder removal.     - ILR explant today; consent is obtained. Procedure, risks, and benefits were discussed with the patient All questions answered.   - Keflex 500 mg 1 dose ordered for prophylaxis  - 1 month follow up will be arranged in the office for a wound check

## 2025-01-02 NOTE — H&P ADULT - HISTORY OF PRESENT ILLNESS
Mr. Leandro Haley is a pleasant 64 year-old man with cardiomyopathy, improved EF, BEVERLEY on CPAP, hypertension, hyperlipidemia, and recurrent syncope. Etiology of syncope unclear; could be related to bradyarrhythmias, sleep apnea, or neurological. He underwent an ILR on 4/29/2021 for long term arrhythmia monitoring.  He is following with a neurologist Dr. Montes; as per patient he had MRI and EEG.  He underwent PVI with cryoablation and CTI flutter ablation on 1/19/2022. The most recent interrogation from the office revealed: multiple Jeffery events 8/27 - present. Most nocturnal when patient  was sleeping. Patient sleeps from about 2/3am - 10/10:30am. Patient reports multiple syncopal episodes last year. No events on ILR to correlate. Tilt-Table test done at [facility] (-).  Patient reports feeling well today and offers no complaints.   Mr. Haley presents to EP lab today for a loop recorder removal.

## 2025-01-02 NOTE — ASU PATIENT PROFILE, ADULT - FALL HARM RISK - RISK INTERVENTIONS

## 2025-01-02 NOTE — H&P ADULT - NSVTERISKREFERASSESS_GEN_ALL_CORE
Refer to the Assessment tab to view/cancel completed assessment. FAMILY HISTORY:  FHx: hypertension, father

## 2025-01-02 NOTE — ASU PATIENT PROFILE, ADULT - NSSUBSTANCEUSE_GEN_ALL_CORE_SD
----- Message from Tin Lara MD sent at 12/23/2024  9:07 AM EST -----  Good morning Marcie  I would appreciate if you can arrange for Mr. Mckinney with me early next year.  Thank you very much have a wonderful day   never used

## 2025-01-02 NOTE — ASU DISCHARGE PLAN (ADULT/PEDIATRIC) - CARE PROVIDER_API CALL
Wilner Bennett  Cardiac Electrophysiology  [address] [place] [address], [address]  [place] [address]  Phone: [phone]  Fax: [phone]  Follow Up Time:

## 2025-01-02 NOTE — PROGRESS NOTE ADULT - SUBJECTIVE AND OBJECTIVE BOX
Electrophysiology Brief Post-Op Note    I have personally seen and examined the patient.  I agree with the history and physical which I have reviewed and noted any changes below.  01-02-25 @ 11:05    PRE-OP DIAGNOSIS: Syncope   POST-OP DIAGNOSIS: Syncope    PROCEDURE: Loop Implnat    Physician: Dr. Grace  PA: ANMOL Lopez    ESTIMATED BLOOD LOSS:  2    mL    ANESTHESIA TYPE:  [  ]General Anesthesia  [  ] Sedation  [X  ] Local/Regional    CONDITION  [  ] Critical  [  ] Serious  [  ]Fair  [ X ]Good    SPECIMENS REMOVED (IF APPLICABLE):  MDT ILR removed ([id number])    IMPLANTS (IF APPLICABLE)  None    FINDINGS  PLAN OF CARE  - May remove bandaid in 2 days  - May shower in 2 days  Follow up in EP office in 1 month

## 2025-01-02 NOTE — ASU DISCHARGE PLAN (ADULT/PEDIATRIC) - NS MD DC FALL RISK RISK
For information on Fall & Injury Prevention, visit: [email or web address] OR  [email or web address] OR  https://www.cdc.gov/steadi/patient.html

## 2025-01-07 ENCOUNTER — APPOINTMENT (OUTPATIENT)
Dept: PULMONOLOGY | Facility: CLINIC | Age: 65
End: 2025-01-07
Payer: MEDICARE

## 2025-01-07 DIAGNOSIS — G47.33 OBSTRUCTIVE SLEEP APNEA (ADULT) (PEDIATRIC): ICD-10-CM

## 2025-01-07 DIAGNOSIS — E66.812 OBESITY, CLASS 2: ICD-10-CM

## 2025-01-07 DIAGNOSIS — I25.10 ATHEROSCLEROTIC HEART DISEASE OF NATIVE CORONARY ARTERY W/OUT ANGINA PECTORIS: ICD-10-CM

## 2025-01-07 DIAGNOSIS — I48.0 PAROXYSMAL ATRIAL FIBRILLATION: ICD-10-CM

## 2025-01-07 PROCEDURE — 99213 OFFICE O/P EST LOW 20 MIN: CPT

## 2025-01-07 PROCEDURE — G2211 COMPLEX E/M VISIT ADD ON: CPT

## 2025-01-16 ENCOUNTER — TRANSCRIPTION ENCOUNTER (OUTPATIENT)
Age: 65
End: 2025-01-16

## 2025-01-27 ENCOUNTER — APPOINTMENT (OUTPATIENT)
Dept: NEUROLOGY | Facility: CLINIC | Age: 65
End: 2025-01-27

## 2025-02-24 ENCOUNTER — APPOINTMENT (OUTPATIENT)
Dept: ELECTROPHYSIOLOGY | Facility: CLINIC | Age: 65
End: 2025-02-24

## 2025-03-10 ENCOUNTER — APPOINTMENT (OUTPATIENT)
Dept: ELECTROPHYSIOLOGY | Facility: CLINIC | Age: 65
End: 2025-03-10
Payer: MEDICARE

## 2025-03-10 VITALS
DIASTOLIC BLOOD PRESSURE: 82 MMHG | BODY MASS INDEX: 40.34 KG/M2 | SYSTOLIC BLOOD PRESSURE: 126 MMHG | HEART RATE: 69 BPM | HEIGHT: 67 IN | WEIGHT: 257 LBS

## 2025-03-10 DIAGNOSIS — I48.0 PAROXYSMAL ATRIAL FIBRILLATION: ICD-10-CM

## 2025-03-10 DIAGNOSIS — E78.5 HYPERLIPIDEMIA, UNSPECIFIED: ICD-10-CM

## 2025-03-10 DIAGNOSIS — I25.10 ATHEROSCLEROTIC HEART DISEASE OF NATIVE CORONARY ARTERY W/OUT ANGINA PECTORIS: ICD-10-CM

## 2025-03-10 DIAGNOSIS — I10 ESSENTIAL (PRIMARY) HYPERTENSION: ICD-10-CM

## 2025-03-10 PROCEDURE — 99204 OFFICE O/P NEW MOD 45 MIN: CPT

## 2025-03-10 PROCEDURE — 93000 ELECTROCARDIOGRAM COMPLETE: CPT

## 2025-03-10 RX ORDER — MUPIROCIN 20 MG/G
2 OINTMENT TOPICAL
Refills: 0 | Status: ACTIVE | COMMUNITY
Start: 2025-02-14

## 2025-03-19 ENCOUNTER — APPOINTMENT (OUTPATIENT)
Dept: NEUROLOGY | Facility: CLINIC | Age: 65
End: 2025-03-19
Payer: MEDICARE

## 2025-03-19 VITALS
WEIGHT: 253 LBS | DIASTOLIC BLOOD PRESSURE: 78 MMHG | HEIGHT: 67 IN | BODY MASS INDEX: 39.71 KG/M2 | SYSTOLIC BLOOD PRESSURE: 135 MMHG | RESPIRATION RATE: 20 BRPM | HEART RATE: 79 BPM | OXYGEN SATURATION: 98 %

## 2025-03-19 DIAGNOSIS — R55 SYNCOPE AND COLLAPSE: ICD-10-CM

## 2025-03-19 DIAGNOSIS — R56.9 UNSPECIFIED CONVULSIONS: ICD-10-CM

## 2025-03-19 PROCEDURE — 99204 OFFICE O/P NEW MOD 45 MIN: CPT

## 2025-03-24 ENCOUNTER — NON-APPOINTMENT (OUTPATIENT)
Age: 65
End: 2025-03-24

## 2025-03-26 ENCOUNTER — APPOINTMENT (OUTPATIENT)
Dept: CARDIOLOGY | Facility: CLINIC | Age: 65
End: 2025-03-26

## 2025-03-28 ENCOUNTER — APPOINTMENT (OUTPATIENT)
Dept: NEUROLOGY | Facility: CLINIC | Age: 65
End: 2025-03-28
Payer: MEDICARE

## 2025-03-28 PROCEDURE — 95816 EEG AWAKE AND DROWSY: CPT

## 2025-03-31 ENCOUNTER — TRANSCRIPTION ENCOUNTER (OUTPATIENT)
Age: 65
End: 2025-03-31

## 2025-03-31 ENCOUNTER — INPATIENT (INPATIENT)
Facility: HOSPITAL | Age: 65
LOS: 0 days | Discharge: HOME | DRG: 101 | End: 2025-03-31
Attending: STUDENT IN AN ORGANIZED HEALTH CARE EDUCATION/TRAINING PROGRAM | Admitting: PSYCHIATRY & NEUROLOGY
Payer: MEDICARE

## 2025-03-31 VITALS
HEIGHT: 67 IN | HEART RATE: 61 BPM | DIASTOLIC BLOOD PRESSURE: 70 MMHG | WEIGHT: 255.74 LBS | TEMPERATURE: 98 F | RESPIRATION RATE: 100 BRPM | SYSTOLIC BLOOD PRESSURE: 125 MMHG

## 2025-03-31 DIAGNOSIS — G40.909 EPILEPSY, UNSPECIFIED, NOT INTRACTABLE, WITHOUT STATUS EPILEPTICUS: ICD-10-CM

## 2025-03-31 DIAGNOSIS — Z98.890 OTHER SPECIFIED POSTPROCEDURAL STATES: Chronic | ICD-10-CM

## 2025-03-31 PROCEDURE — 95700 EEG CONT REC W/VID EEG TECH: CPT

## 2025-03-31 PROCEDURE — 99221 1ST HOSP IP/OBS SF/LOW 40: CPT

## 2025-03-31 PROCEDURE — ZZZZZ: CPT

## 2025-03-31 PROCEDURE — 95718 EEG PHYS/QHP 2-12 HR W/VEEG: CPT

## 2025-03-31 PROCEDURE — 95713 VEEG 2-12 HR CONT MNTR: CPT

## 2025-03-31 RX ORDER — ACETAMINOPHEN 500 MG/5ML
650 LIQUID (ML) ORAL EVERY 6 HOURS
Refills: 0 | Status: DISCONTINUED | OUTPATIENT
Start: 2025-03-31 | End: 2025-03-31

## 2025-03-31 RX ORDER — APIXABAN 2.5 MG/1
5 TABLET, FILM COATED ORAL
Refills: 0 | Status: DISCONTINUED | OUTPATIENT
Start: 2025-03-31 | End: 2025-03-31

## 2025-03-31 RX ORDER — LAMOTRIGINE 150 MG/1
100 TABLET ORAL
Refills: 0 | Status: DISCONTINUED | OUTPATIENT
Start: 2025-03-31 | End: 2025-03-31

## 2025-03-31 RX ORDER — TOPIRAMATE 25 MG/1
400 TABLET, FILM COATED ORAL
Refills: 0 | Status: DISCONTINUED | OUTPATIENT
Start: 2025-03-31 | End: 2025-03-31

## 2025-03-31 RX ORDER — TOPIRAMATE 25 MG/1
2 TABLET, FILM COATED ORAL
Refills: 0 | DISCHARGE

## 2025-03-31 RX ORDER — ONDANSETRON HCL/PF 4 MG/2 ML
4 VIAL (ML) INJECTION EVERY 8 HOURS
Refills: 0 | Status: DISCONTINUED | OUTPATIENT
Start: 2025-03-31 | End: 2025-03-31

## 2025-03-31 RX ORDER — NICOTINE POLACRILEX 4 MG/1
1 GUM, CHEWING ORAL DAILY
Refills: 0 | Status: DISCONTINUED | OUTPATIENT
Start: 2025-03-31 | End: 2025-03-31

## 2025-03-31 RX ORDER — TOPIRAMATE 25 MG/1
200 TABLET, FILM COATED ORAL
Refills: 0 | Status: DISCONTINUED | OUTPATIENT
Start: 2025-03-31 | End: 2025-03-31

## 2025-03-31 RX ORDER — METOPROLOL SUCCINATE 50 MG/1
100 TABLET, EXTENDED RELEASE ORAL DAILY
Refills: 0 | Status: DISCONTINUED | OUTPATIENT
Start: 2025-03-31 | End: 2025-03-31

## 2025-03-31 RX ORDER — MELATONIN 5 MG
3 TABLET ORAL AT BEDTIME
Refills: 0 | Status: DISCONTINUED | OUTPATIENT
Start: 2025-03-31 | End: 2025-03-31

## 2025-03-31 RX ORDER — INFLUENZA A VIRUS A/IDAHO/07/2018 (H1N1) ANTIGEN (MDCK CELL DERIVED, PROPIOLACTONE INACTIVATED, INFLUENZA A VIRUS A/INDIANA/08/2018 (H3N2) ANTIGEN (MDCK CELL DERIVED, PROPIOLACTONE INACTIVATED), INFLUENZA B VIRUS B/SINGAPORE/INFTT-16-0610/2016 ANTIGEN (MDCK CELL DERIVED, PROPIOLACTONE INACTIVATED), INFLUENZA B VIRUS B/IOWA/06/2017 ANTIGEN (MDCK CELL DERIVED, PROPIOLACTONE INACTIVATED) 15; 15; 15; 15 UG/.5ML; UG/.5ML; UG/.5ML; UG/.5ML
0.5 INJECTION, SUSPENSION INTRAMUSCULAR ONCE
Refills: 0 | Status: DISCONTINUED | OUTPATIENT
Start: 2025-03-31 | End: 2025-03-31

## 2025-03-31 RX ORDER — LAMOTRIGINE 150 MG/1
1 TABLET ORAL
Refills: 0 | DISCHARGE

## 2025-03-31 RX ORDER — ENALAPRIL MALEATE 20 MG
5 TABLET ORAL
Refills: 0 | Status: DISCONTINUED | OUTPATIENT
Start: 2025-03-31 | End: 2025-03-31

## 2025-03-31 RX ORDER — MAGNESIUM, ALUMINUM HYDROXIDE 200-200 MG
30 TABLET,CHEWABLE ORAL EVERY 4 HOURS
Refills: 0 | Status: DISCONTINUED | OUTPATIENT
Start: 2025-03-31 | End: 2025-03-31

## 2025-03-31 NOTE — DISCHARGE NOTE NURSING/CASE MANAGEMENT/SOCIAL WORK - NSDCVIVACCINE_GEN_ALL_CORE_FT
In order to meet Medicare requirements, the clinical documentation must support the information cited in the admission order.  Please be sure to provide detailed and clear documentation about the following in the admitting note/history and physical:
No Vaccines Administered.

## 2025-03-31 NOTE — H&P ADULT - HISTORY OF PRESENT ILLNESS
This is a 66 yo male PMHx of cardiomyopathy, BEVERLEY (on biPAP), hypertension, hyperlipidemia, Afib s/p ablation (on Eliquis). s/p ILR removal 1/2025, and recurrent syncope presenting for elective VEEG. Patient states he has 7-8 syncopal episodes per month x 3-4 years, in the last 3 months has had 1-2 syncopal episodes per month. States unconscious for a few minutes, often unwitnessed, aware of what occurred post episode. Pt admits to syncopal episodes beginning at the same time mother was diagnosed with dementia. Notices episodes occur later in the day.  Patient reports he had outpatient EEG done last Friday, no abnormalities noted. Denies visual disturbances prior to episode, denies tongue biting, foaming of the mouth, numbness/ tingling, shaking, confusion, dizziness, HA, SOB, palpitations.

## 2025-03-31 NOTE — PATIENT PROFILE ADULT - NSPROGENPREVTRANSF_GEN_A_NUR
Anticoagulation Management    Unable to reach patient today.    Today's INR result of 4.1 is supratherapeutic (goal INR of 2.0-3.0).  Result received from: Clinic Lab    Follow up required to discuss out of range INR     Left message to hold warfarin tonight.      Anticoagulation clinic to follow up    Latosha Perdomo RN  Transfer return call to: [phone]  ANTICOAGULATION FOLLOW-UP CLINIC VISIT    Patient Name:  Nelsy Cota  Date:  2020  Contact Type:  Telephone/ Called patient, denies any changes to meds, diet, alcohol use. Orders discussed with patient.     SUBJECTIVE:  Patient Findings     Positives:   Bruising (Patient reports has been bruising easier. )    Comments:   The patient was assessed for diet, medication, and activity level changes, missed or extra doses, bleeding, with no problem findings.  Patient denies any identifiable changes that caused the supratherapeutic INR.           Clinical Outcomes     Comments:   The patient was assessed for diet, medication, and activity level changes, missed or extra doses, bleeding, with no problem findings.  Patient denies any identifiable changes that caused the supratherapeutic INR.              OBJECTIVE    INR   Date Value Ref Range Status   2020 4.10 (H) 0.86 - 1.14 Final     Comment:     This test is intended for monitoring Coumadin therapy.  Results are not   accurate in patients with prolonged INR due to factor deficiency.         ASSESSMENT / PLAN  INR assessment SUPRA    Recheck INR In: 6 DAYS    INR Location Outside lab      Anticoagulation Summary  As of 2020    INR goal:   2.0-3.0   TTR:   35.1 % (11.2 mo)   INR used for dosin.10! (2020)   Warfarin maintenance plan:   5 mg (2 mg x 2.5) every Thu; 4 mg (2 mg x 2) all other days   Full warfarin instructions:   : Hold; 2: 4 mg; Otherwise 5 mg every Thu; 4 mg all other days   Weekly warfarin total:   29 mg   Plan last modified:   Latosha Perdomo RN (2019)   Next  INR check:   4/7/2020   Priority:   High   Target end date:       Indications    Long-term (current) use of anticoagulants [Z79.01] [Z79.01]  PE (pulmonary thromboembolism) (H) [I26.99]  Recurrent deep venous thrombosis (H) [I82.409]             Anticoagulation Episode Summary     INR check location:       Preferred lab:       Send INR reminders to:   ULISES SMITH    Comments:         Anticoagulation Care Providers     Provider Role Specialty Phone number    Hamzah Thomas MD [facility] [phone]            See the Encounter Report to view Anticoagulation Flowsheet and Dosing Calendar (Go to Encounters tab in chart review, and find the Anticoagulation Therapy Visit)    Dosage adjustment made based on physician directed care plan.    Latosha Perdomo RN                  no

## 2025-03-31 NOTE — DISCHARGE NOTE NURSING/CASE MANAGEMENT/SOCIAL WORK - FINANCIAL ASSISTANCE
[facility] provides services at a reduced cost to those who are determined to be eligible through [facility]’s financial assistance program. Information regarding [facility]’s financial assistance program can be found by going to [email or web address] or by calling [phone].

## 2025-03-31 NOTE — DISCHARGE NOTE NURSING/CASE MANAGEMENT/SOCIAL WORK - NSDCPEFALRISK_GEN_ALL_CORE
For information on Fall & Injury Prevention, visit: [email or web address] OR  [email or web address] OR  https://www.cdc.gov/steadi/patient.html

## 2025-03-31 NOTE — DISCHARGE NOTE PROVIDER - NSDCFUSCHEDAPPT_GEN_ALL_CORE_FT
Clay Aldridge  [facility]  CARDIOLOGY [address] [place] [address]  Scheduled Appointment: 04/29/2025

## 2025-03-31 NOTE — PATIENT PROFILE ADULT - FUNCTIONAL ASSESSMENT - BASIC MOBILITY ASSESSMENT TYPE
Problem: At Risk for Falls  Goal: # Patient does not fall  Outcome: Outcome Met, Continue evaluating goal progress toward completion  Goal: # Takes action to control fall-related risks  Outcome: Outcome Met, Continue evaluating goal progress toward completion  Goal: # Verbalizes understanding of fall risk/precautions  Description: Document education using the patient education activity  Outcome: Outcome Met, Continue evaluating goal progress toward completion     Problem: Pain  Goal: #Acceptable pain level achieved/maintained at rest using NRS/Faces  Description: This goal is used for patients who can self-report.  Acceptable means the level is at or below the identified comfort/function goal.  Outcome: Outcome Met, Continue evaluating goal progress toward completion  Goal: # Acceptable pain level achieved/maintained at rest using NRS/Faces without oversedation (opioid naive or PCA/Epidural infusion)  Description: This goal is used if Opioid-naïve or on PCA/Epidural Infusion.  Outcome: Outcome Met, Continue evaluating goal progress toward completion  Goal: # Acceptable pain level achieved/maintained with activity using NRS/Faces  Description: This goal is used for patients who can self-report and are not achieving acceptable pain control during activity.  Outcome: Outcome Met, Continue evaluating goal progress toward completion  Goal: Acceptable pain/comfort level is achieved/maintained at rest (based on Pain Behaviors Scale)  Description: This goal is used for patients who are not able to self-report pain and are assessed for pain using the Pain Behaviors Scale  Outcome: Outcome Met, Continue evaluating goal progress toward completion  Goal: Acceptable pain/comfort level is achieved/maintained at rest based on PAINAID scale (Dementia)  Description: This goal is used for patients who are not able to self-report pain, have dementia, and assessed using the PAINAD scale.  Outcome: Outcome Met, Continue evaluating  goal progress toward completion  Goal: Acceptable pain/comfort level is achieved/maintained at rest (based on pediatric behavior tool: NIPS, NPASS, or FLACC)  Description: This goal is used for pediatric patients who are not able to self report pain.  Outcome: Outcome Met, Continue evaluating goal progress toward completion  Goal: # Verbalizes understanding of pain management  Description: Documented in Patient Education Activity  Outcome: Outcome Met, Continue evaluating goal progress toward completion  Goal: Verbalizes understanding and effective use of Patient Controlled Analgesia (PCA)  Description: Documented in Patient Education Activity  This goal is used for patients with PCA  Outcome: Outcome Met, Continue evaluating goal progress toward completion  Goal: Maximum comfort achieved/maintained at end of life (Hospice)  Outcome: Outcome Met, Continue evaluating goal progress toward completion     Problem: VTE, Risk for  Goal: # No s/s of VTE  Outcome: Outcome Met, Continue evaluating goal progress toward completion  Goal: # Verbalizes understanding of VTE risk factors and prevention  Description: Document education using the patient education activity.   Outcome: Outcome Met, Continue evaluating goal progress toward completion  Goal: Demonstrates ability to administer injectable anticoagulants if ordered for d/c  Description: Document education using the patient education activity.  Outcome: Outcome Met, Continue evaluating goal progress toward completion     Problem: Pressure Injury, Risk for  Goal: # Skin remains intact  Outcome: Outcome Met, Continue evaluating goal progress toward completion     Problem: Impaired Physical Mobility  Goal: # Bed mobility, ambulation, and ADLs are maintained or returned to baseline during hospitalization  Outcome: Outcome Met, Continue evaluating goal progress toward completion     Problem: Angina/Chest Pain  Goal: # Achieves Chest Pain Control (Pain Score = 0-1, no  episodes)  Description: Chest pain control = Pain Score = 0-1, no episodes of pain  Outcome: Outcome Met, Continue evaluating goal progress toward completion  Goal: # Verbalizes understanding of symptoms, diagnosis, and treatment  Description: Document on Patient Education Activity  Outcome: Outcome Met, Continue evaluating goal progress toward completion     Problem: Activity Intolerance  Goal: # Functional status is maintained or returned to baseline  Outcome: Outcome Met, Continue evaluating goal progress toward completion  Goal: # Tolerates activity for d/c setting with no clinical problems  Outcome: Outcome Met, Continue evaluating goal progress toward completion     Problem: Breathing Pattern Ineffective  Goal: Air exchange is effective, demonstrated by Sp02 sat of greater then or = 92% (or as ordered)  Outcome: Outcome Met, Continue evaluating goal progress toward completion  Goal: Respiratory pattern is quiet and regular without report of SOB  Outcome: Outcome Met, Continue evaluating goal progress toward completion     Problem: Diabetes  Goal: Glycemic balance achieved/maintained  Description: Goal is to maintain blood sugar within range with no episodes of hypoglycemia  Outcome: Outcome Met, Continue evaluating goal progress toward completion  Goal: Verbalizes/demonstrates understanding of Diabetes  Description: Document on Patient Education Activity  Outcome: Outcome Met, Continue evaluating goal progress toward completion  Goal: Demonstrates ability to self-administer insulin  Description: Document on Patient Education Activity  Outcome: Outcome Met, Continue evaluating goal progress toward completion      Admission

## 2025-03-31 NOTE — DISCHARGE NOTE PROVIDER - PROVIDER TOKENS
PROVIDER:[TOKEN:[[id number]],FOLLOWUP:[1 week]],PROVIDER:[TOKEN:[[id number]],FOLLOWUP:[1 week]]

## 2025-03-31 NOTE — CONSULT NOTE ADULT - NS ATTEND AMEND GEN_ALL_CORE FT
Agree with the Hx and the plan  He carries the Dx of epilepsy. not well characterized  . He does also have BEVERLEY  continues to have episodic  events not well characterized and also not highly suggestive of epileptic event  Will stat the monitoring   No change in AED  seizure precation

## 2025-03-31 NOTE — DISCHARGE NOTE PROVIDER - NSDCMRMEDTOKEN_GEN_ALL_CORE_FT
Eliquis 5 mg oral tablet: 1 tab(s) orally 2 times a day  enalapril 5 mg oral tablet: 1 tab(s) orally 2 times a day  lamoTRIgine 100 mg oral tablet: 1 tab(s) orally 2 times a day  metoprolol succinate 100 mg oral tablet, extended release: 1 tab(s) orally once a day  pravastatin 20 mg oral tablet: 1 tab(s) orally once a day  topiramate 200 mg oral tablet: 2 tab(s) orally 2 times a day

## 2025-03-31 NOTE — H&P ADULT - ASSESSMENT
This is a 66 yo male PMHx of cardiomyopathy, BEVERLEY (on biPAP), hypertension, hyperlipidemia, Afib s/p ablation (on Eliquis). s/p ILR removal 1/2025, and recurrent syncope presenting for elective VEEG. Patient states he has 7-8 syncopal episodes per month x 3-4 years, in the last 3 months has had 1-2 syncopal episodes per month. States unconscious for a few minutes, often unwitnessed, aware of what occurred post episode. Pt admits to syncopal episodes beginning at the same time mother was diagnosed with dementia. Notices episodes occur later in the day.  Patient reports he had outpatient EEG done last Friday, no abnormalities noted. Denies visual disturbances prior to episode, denies tongue biting, foaming of the mouth, numbness/ tingling, shaking, confusion, dizziness, HA, SOB, palpitations.    #Elective VEEG  -Admit to inpatient level of care under medicine   -VEEG  -Neurology consult  -Monitor electrolytes, keep Mg >2, K >4   -Seizure precautions   -C/w home medications: lamotrigine 100 mg BID, topiramate 400 mg BID   -F/U 7 pm labs and lamotrigine/topiramate serum levels     #BEVERLEY  -c/w bipap qhs     #Hypertension  -c/w home enalapril 5 mg BID     #HLD   -c/w statin    #Afib s/p ablation (on Eliquis)  -c/w metoprolol succinate  mg and eliquis 5 BID     #Tobacco use   - Smoking cessation provided   - C/w nicotine patch     Diet: DASH  Activity: AAT  VTE ppx: C/w Eliquis   CHG: ordered    Above discussed with Dr. Mcclendon

## 2025-03-31 NOTE — CONSULT NOTE ADULT - ASSESSMENT
Assessment:  This is a 65 year old Male with h/o severe BEVELREY on C-PAP, CAD, HTN, DLD, cardiomyopathy, paroxysmal A-fib, chronic back pain, cholelithiasis, loop recorder, with recurrent episodes of passing out.        Plan:   - VEEG monitoring for better characterization and assessment  - Seizure precautions  - Keep am dose of Topamax at 400 mgs, decrease pm dose to 300 mgs  - Keep same Lamictal dose  - Ativan 2mg IV PRN for generalized tonic-clonic seizure lasting longer than 2 minutes, or two consecutive seizures without return to baseline in-between   - CBC, CMP, Mg, Topamax, Lamictal levels trough  - Keep Mg above 2    Case discussed with Dr Her    Plan discussed with patient in details, all questions answered.    Discussed with nursing and medical teams.

## 2025-03-31 NOTE — DISCHARGE NOTE PROVIDER - NSDCCPCAREPLAN_GEN_ALL_CORE_FT
PRINCIPAL DISCHARGE DIAGNOSIS  Diagnosis: Nonintractable epilepsy  Assessment and Plan of Treatment: Pt admitted for elective VEEG - unable to perform VEEG monitoring as pt requesting to leave against medical advice.   C/w home medications: lamotrigine 100 mg BID, topiramate 400 mg BID. Will need f/u with neurologist  upon discharge and for further follow up.

## 2025-03-31 NOTE — DISCHARGE NOTE PROVIDER - CARE PROVIDER_API CALL
Kamron Treadwell  Neurology  [address] [place] [address]  Phone: [phone]  Fax: [phone]  Follow Up Time: 1 week    Cheryl Dexter  Internal Medicine  [address] [place] [address]  Phone: [phone]  Fax: [phone]  Follow Up Time: 1 week

## 2025-03-31 NOTE — PATIENT PROFILE ADULT - NSPROPTRIGHTSUPPORTNAME_GEN_A_NUR
PHLEBOTOMIST ATTEMPTED TO DRAW BLOOD ON HER RIGHT POSTERIOR HAND; HOWEVER, SHE WAS UNABLE TO 
GET ANY BLOOD. PATIENT THEN GOT IRRITATED AND AGITATED. PHLEBOTOMIST WAS ADVICE TO COME BACK 
LATER AND TRY AGAIN. WILL CONTINUE TO MONITOR. Gustavo Harris

## 2025-03-31 NOTE — DISCHARGE NOTE PROVIDER - HOSPITAL COURSE
This is a 64 yo male PMHx of cardiomyopathy, BEVERLEY (on biPAP), hypertension, hyperlipidemia, Afib s/p ablation (on Eliquis). s/p ILR removal 1/2025, and recurrent syncope presenting for elective VEEG. Patient states he has 7-8 syncopal episodes per month x 3-4 years, in the last 3 months has had 1-2 syncopal episodes per month. States unconscious for a few minutes, often unwitnessed, aware of what occurred post episode. Pt admits to syncopal episodes beginning at the same time mother was diagnosed with dementia. Notices episodes occur later in the day.  Patient reports he had outpatient EEG done last Friday, no abnormalities noted. Denies visual disturbances prior to episode, denies tongue biting, foaming of the mouth, numbness/ tingling, shaking, confusion, dizziness, HA, SOB, palpitations.    #Elective VEEG - unable to perform VEEG monitoring as pt requesting to leave AMA   -C/w home medications: lamotrigine 100 mg BID, topiramate 400 mg BID   -Will need f/u with neurologist     #BEVERLEY  -c/w bipap qhs     #Hypertension  -c/w home enalapril 5 mg BID     #HLD   -c/w statin    #Afib s/p ablation (on Eliquis)  -c/w metoprolol succinate  mg and eliquis 5 BID     #Tobacco use   - Smoking cessation provided     Pt admitted today for elective VEEG; however, pt would like to leave AMA at this time as he needs to take care of his mother at home and cannot stay inpatient any further. Pt is A&Ox4 and of full functional capacity. The patient understands his condition and the risks of leaving AMA, pt accepts responsibility for any and all results of this decision. Pt has been referred to neurologist and PCP for follow up. Please continue with all home medications.   Above discussed with

## 2025-03-31 NOTE — DISCHARGE NOTE NURSING/CASE MANAGEMENT/SOCIAL WORK - PATIENT PORTAL LINK FT
You can access the FollowMyHealth Patient Portal offered by [facility] by registering at the following website: [email or web address]. By joining [facility]’s FollowMyHealth portal, you will also be able to view your health information using other applications (apps) compatible with our system.

## 2025-03-31 NOTE — H&P ADULT - NSHPPHYSICALEXAM_GEN_ALL_CORE
T(C): --  HR: --  BP: --  RR: --  SpO2: --    PHYSICAL EXAM:  GENERAL: NAD, looks stated age  HEAD:  Atraumatic, Normocephalic  EYES: EOMI, PERRL, conjunctiva and sclera clear  NECK: Supple,  CHEST/LUNG: Clear to auscultation bilaterally; No rales, rhonchi or wheezing  HEART: S1,S2 Regular rate and rhythm; No murmurs, rubs, or gallops  ABDOMEN: Soft, nontender, nondistended, no rebound tenderness; Bowel sounds present and normoactive  EXTREMITIES: No peripheral edema b/l LE   NEUROLOGY: non-focal, cranial nerves II-XII intact, muscle strength 5/5 all extremities, AAOx3, sensation intact b/l   SKIN: No rashes or lesions

## 2025-04-01 NOTE — EEG REPORT - NS EEG TEXT BOX
Epilepsy Attending Note:     ERICHCARA NIX    65y Male  MRN [id number]    Vital Signs Last 24 Hrs  T(C): 36.8 (31 Mar 2025 14:11), Max: 36.8 (31 Mar 2025 14:11)  T(F): 98.2 (31 Mar 2025 14:11), Max: 98.2 (31 Mar 2025 14:11)  HR: 61 (31 Mar 2025 14:11) (61 - 61)  BP: 125/70 (31 Mar 2025 14:11) (125/70 - 125/70)  BP(mean): --  RR: 100 (31 Mar 2025 14:11) (100 - 100)  SpO2: --                    MEDICATIONS  (STANDING):    MEDICATIONS  (PRN):            VEEG  X 3  hours:    Background---------------continues, symmetrical, well organized reaching 8-9 hz showing good reactivity and modulation    Focal and generalized slowing---------------none    Interictal activity---------------- none    Events----------------none    Seizures---------none    Impression:   normal awake and drowsy V-EEG X 3 hours    Plan - patient has family issues. wants to leave . DC the monitoring . F/U

## 2025-04-01 NOTE — EEG REPORT - RECORDING TECHNIQUE:
-- DO NOT REPLY / DO NOT REPLY ALL --  -- Message is from Engagement Center Operations (ECO) --    ONLY TO BE USED WITHIN A REFILL MEDICATION ENCOUNTER    Med Refill  Is the patient currently having any symptoms?: No/Non-Emergent symptoms    Name of medication requested: See pended med   Pharmacy did not receive the medication as of yet.    Has patient contacted the pharmacy? Yes    Is this the first request for the medication in the last 48 hours?: Yes    Patient is requesting a medication refill - medication is on active medication list    Patient is currently OUT of the requested medication - sent as HIGH priority      Full name of the provider who ordered the medication: Rossi Pathak MD    Clinic site name / Account # for provider: [id number] [place]    Preferred Pharmacy: Pharmacy  [facility] [address] [place] [address]    Patient confirmed the above pharmacy as correct?  Yes      Caller Information       Type Contact Phone/Fax    05/04/2023 06:03 PM CDT Phone (Incoming) Alexus Robles (Mother) [phone] (M)          Alternative phone number: none    Can a detailed message be left?: Yes    Patient is completely out of medication: Verify if patient is currently experiencing symptoms. If patient is symptomatic, proceed with front end triage instead of medication refill. If patient is not symptomatic but is completely out of medication, patsy as High priority when routing. Inform patient: “Please call back with any questions or concerns and if your condition becomes life threatening, you should seek immediate medical assistance by calling 911 or going to the Emergency Department for evaluation.”    Inform all patients: \"If the clinical team needs to contact you regarding this refill, please be aware the return phone call may come from an unidentified or out of state phone number and your refill request will be addressed as soon as the clinical team reviews your message.\"  
Left message to have mother return call regarding medication.   
Statement Selected

## 2025-04-04 DIAGNOSIS — F17.210 NICOTINE DEPENDENCE, CIGARETTES, UNCOMPLICATED: ICD-10-CM

## 2025-04-04 DIAGNOSIS — I42.9 CARDIOMYOPATHY, UNSPECIFIED: ICD-10-CM

## 2025-04-04 DIAGNOSIS — I25.10 ATHEROSCLEROTIC HEART DISEASE OF NATIVE CORONARY ARTERY WITHOUT ANGINA PECTORIS: ICD-10-CM

## 2025-04-04 DIAGNOSIS — G89.29 OTHER CHRONIC PAIN: ICD-10-CM

## 2025-04-04 DIAGNOSIS — Z79.01 LONG TERM (CURRENT) USE OF ANTICOAGULANTS: ICD-10-CM

## 2025-04-04 DIAGNOSIS — I10 ESSENTIAL (PRIMARY) HYPERTENSION: ICD-10-CM

## 2025-04-04 DIAGNOSIS — G47.33 OBSTRUCTIVE SLEEP APNEA (ADULT) (PEDIATRIC): ICD-10-CM

## 2025-04-04 DIAGNOSIS — I48.0 PAROXYSMAL ATRIAL FIBRILLATION: ICD-10-CM

## 2025-04-04 DIAGNOSIS — E66.01 MORBID (SEVERE) OBESITY DUE TO EXCESS CALORIES: ICD-10-CM

## 2025-04-04 DIAGNOSIS — E78.00 PURE HYPERCHOLESTEROLEMIA, UNSPECIFIED: ICD-10-CM

## 2025-04-04 DIAGNOSIS — G40.909 EPILEPSY, UNSPECIFIED, NOT INTRACTABLE, WITHOUT STATUS EPILEPTICUS: ICD-10-CM

## 2025-04-29 ENCOUNTER — APPOINTMENT (OUTPATIENT)
Dept: CARDIOLOGY | Facility: CLINIC | Age: 65
End: 2025-04-29
Payer: MEDICARE

## 2025-04-29 ENCOUNTER — NON-APPOINTMENT (OUTPATIENT)
Age: 65
End: 2025-04-29

## 2025-04-29 ENCOUNTER — RESULT CHARGE (OUTPATIENT)
Age: 65
End: 2025-04-29

## 2025-04-29 VITALS
BODY MASS INDEX: 39.71 KG/M2 | WEIGHT: 253 LBS | HEART RATE: 61 BPM | SYSTOLIC BLOOD PRESSURE: 126 MMHG | DIASTOLIC BLOOD PRESSURE: 70 MMHG | HEIGHT: 67 IN

## 2025-04-29 DIAGNOSIS — I10 ESSENTIAL (PRIMARY) HYPERTENSION: ICD-10-CM

## 2025-04-29 DIAGNOSIS — I42.0 DILATED CARDIOMYOPATHY: ICD-10-CM

## 2025-04-29 DIAGNOSIS — E66.9 OBESITY, UNSPECIFIED: ICD-10-CM

## 2025-04-29 DIAGNOSIS — I25.9 CHRONIC ISCHEMIC HEART DISEASE, UNSPECIFIED: ICD-10-CM

## 2025-04-29 DIAGNOSIS — E78.5 HYPERLIPIDEMIA, UNSPECIFIED: ICD-10-CM

## 2025-04-29 DIAGNOSIS — I25.10 ATHEROSCLEROTIC HEART DISEASE OF NATIVE CORONARY ARTERY W/OUT ANGINA PECTORIS: ICD-10-CM

## 2025-04-29 PROCEDURE — 99204 OFFICE O/P NEW MOD 45 MIN: CPT

## 2025-04-29 PROCEDURE — 93000 ELECTROCARDIOGRAM COMPLETE: CPT

## 2025-05-02 LAB
LAMOTRIGINE SERPL-MCNC: 4 UG/ML
TOPIRAMATE SERPL-MCNC: 23.1 MCG/ML

## 2025-05-13 NOTE — ED ADULT NURSE NOTE - NSFALLRSKASSESASSIST_ED_ALL_ED
[FreeTextEntry1] : This is a 73 y/o male with history of a murmur, hypothyroidism and prostate cancer, no prior known history of heart failure, who was recently diagnosed with afib, severe MR/TR, dilated NICM and HFrEF, now s/p MVr/TVr/MAZE and BETSY ligation with Dr. Rocha 3/28/25, course complicated by post cardiotomy shock now s/p impella.  CM likely longstanding given degree of LV dilation with decompensation in the setting of afib with RVR. He is s/p bilateral chest tube placement 4/3/25 for pleural effusions, He tolerated impella wean with low filling pressures and preserved output and is now s/p Impella removal on 4/8/25. Post OR he's on low dose epinephrine, which was weaned down. He has normal filling pressures on swan post impella removal, LE swelling has improved. Underlying junctional rhythm noted with HR 50-60s, BP low-normotensive. AURY is resolving and s/p guerin placement for failed TOV. + PW, Acc Jun 62, EP following c/w modified amio 200BID coum tonight 3mg per CTU, started on Flomax qHS. Patient has hx of enlarged prostate followed by Urologist Dr. Odell Stewart as outpatient. Lasix increased to 40 BID for B/L pleural effusions. Pt OOB to chair, ambulating. INR 1.20, Coumadin 5mg tonight. Continue Heparin bridge. DC home with MCOT and likely w/ guerin / leg bag. Has out pt urologist. Warfarin INR MGMT with PCP Dr. Sterling Carlton.  Seen 4/29 with his wife. Saw Dr. Hawk last week with cystoscopy, removal of guerin and then reinsertion of guerin. Flomax was doubled and he was started on Finasteride. HAs follow up 5/21. Dr. Hawk discussed possible prostrate surgery when off AC for valves. Now on Lasix 20mg daily, making good urine and weight is down. Saw PCP Dr. Carlton with INR 2.6 4/28. Followed by Dr. Briceno for cards.  At that visit he reported doing and feeling well, "so much better since discharge".  MCOT in place, junctional today. Made pt appointment with Dr. Jose D Camacho of EP for Thursday at 11:45AM. He will follow up with HF as well within the week as I have ensured HF will reach out to the pt for appointment. WIll needs follow up with repeat TTE.  He presents today for follow up with TTE. Presents with his wife.  Feeling much better. No CP, SOB or palpitations.  NSR. Eating and drinking with +BM. Denies chest pain, SOB, swelling, weight gain, palpitations, cough, fever or chills. Followed up with HF and Dr. Camacho for EP.    Today on exam patient's lungs clear bilaterally, normal sinus rhythm, sternum stable, incision clean, dry and intact. No peripheral edema noted. Instructed patient on importance of optimal glycemic control, daily showering, daily weights, incentive spirometer use, and increase ambulation as tolerated. Instructed to call office with any signs or symptoms of infection or weight gain of 2 or more pounds in 1 day or 3 or more pounds in 1 week.   Plan:  - Continue current medication regimen per HF - Follow up with cardiologist, EP and HF - TTE reviewed at time of visit - Follow up with PCP  - Continue to walk and increase as tolerated - Low salt diet and fluids discussed in detail - Tight glucose control discussed in detail for wound healing - SBE antibiotic prophylaxis discussed at length  - Return to office in 4 months with repeat TTE - Call with any questions or concerns    no

## 2025-05-27 ENCOUNTER — APPOINTMENT (OUTPATIENT)
Dept: NEUROLOGY | Facility: CLINIC | Age: 65
End: 2025-05-27
Payer: MEDICARE

## 2025-05-27 VITALS
WEIGHT: 253 LBS | DIASTOLIC BLOOD PRESSURE: 87 MMHG | SYSTOLIC BLOOD PRESSURE: 151 MMHG | HEART RATE: 69 BPM | TEMPERATURE: 97.8 F | HEIGHT: 67 IN | OXYGEN SATURATION: 97 % | BODY MASS INDEX: 39.71 KG/M2

## 2025-05-27 DIAGNOSIS — R56.9 UNSPECIFIED CONVULSIONS: ICD-10-CM

## 2025-05-27 PROCEDURE — 99213 OFFICE O/P EST LOW 20 MIN: CPT

## 2025-06-17 ENCOUNTER — INPATIENT (INPATIENT)
Facility: HOSPITAL | Age: 65
LOS: 0 days | Discharge: HOME | DRG: 101 | End: 2025-06-18
Attending: STUDENT IN AN ORGANIZED HEALTH CARE EDUCATION/TRAINING PROGRAM | Admitting: PSYCHIATRY & NEUROLOGY
Payer: MEDICARE

## 2025-06-17 ENCOUNTER — APPOINTMENT (OUTPATIENT)
Age: 65
End: 2025-06-17
Payer: MEDICARE

## 2025-06-17 VITALS
HEART RATE: 51 BPM | RESPIRATION RATE: 18 BRPM | TEMPERATURE: 98 F | SYSTOLIC BLOOD PRESSURE: 163 MMHG | OXYGEN SATURATION: 99 % | WEIGHT: 253.09 LBS | HEIGHT: 67 IN | DIASTOLIC BLOOD PRESSURE: 87 MMHG

## 2025-06-17 DIAGNOSIS — Z98.890 OTHER SPECIFIED POSTPROCEDURAL STATES: Chronic | ICD-10-CM

## 2025-06-17 DIAGNOSIS — G40.89 OTHER SEIZURES: ICD-10-CM

## 2025-06-17 LAB
ALBUMIN SERPL ELPH-MCNC: 4.5 G/DL — SIGNIFICANT CHANGE UP (ref 3.5–5.2)
ALP SERPL-CCNC: 98 U/L — SIGNIFICANT CHANGE UP (ref 30–115)
ALT FLD-CCNC: 15 U/L — SIGNIFICANT CHANGE UP (ref 0–41)
ANION GAP SERPL CALC-SCNC: 14 MMOL/L — SIGNIFICANT CHANGE UP (ref 7–14)
AST SERPL-CCNC: 15 U/L — SIGNIFICANT CHANGE UP (ref 0–41)
BASOPHILS # BLD AUTO: 0.05 K/UL — SIGNIFICANT CHANGE UP (ref 0–0.2)
BASOPHILS NFR BLD AUTO: 0.6 % — SIGNIFICANT CHANGE UP (ref 0–2)
BILIRUB SERPL-MCNC: 0.7 MG/DL — SIGNIFICANT CHANGE UP (ref 0.2–1.2)
BUN SERPL-MCNC: 21 MG/DL — HIGH (ref 10–20)
CALCIUM SERPL-MCNC: 9.2 MG/DL — SIGNIFICANT CHANGE UP (ref 8.4–10.5)
CHLORIDE SERPL-SCNC: 107 MMOL/L — SIGNIFICANT CHANGE UP (ref 98–110)
CO2 SERPL-SCNC: 20 MMOL/L — SIGNIFICANT CHANGE UP (ref 17–32)
CREAT SERPL-MCNC: 1.1 MG/DL — SIGNIFICANT CHANGE UP (ref 0.7–1.5)
EGFR: 75 ML/MIN/1.73M2 — SIGNIFICANT CHANGE UP
EGFR: 75 ML/MIN/1.73M2 — SIGNIFICANT CHANGE UP
EOSINOPHIL # BLD AUTO: 0.05 K/UL — SIGNIFICANT CHANGE UP (ref 0–0.5)
EOSINOPHIL NFR BLD AUTO: 0.6 % — SIGNIFICANT CHANGE UP (ref 0–6)
GLUCOSE SERPL-MCNC: 106 MG/DL — HIGH (ref 70–99)
HCT VFR BLD CALC: 44.3 % — SIGNIFICANT CHANGE UP (ref 39–50)
HGB BLD-MCNC: 14.5 G/DL — SIGNIFICANT CHANGE UP (ref 13–17)
IMM GRANULOCYTES # BLD AUTO: 0.03 K/UL — SIGNIFICANT CHANGE UP (ref 0–0.07)
IMM GRANULOCYTES NFR BLD AUTO: 0.3 % — SIGNIFICANT CHANGE UP (ref 0–0.9)
LYMPHOCYTES # BLD AUTO: 2.89 K/UL — SIGNIFICANT CHANGE UP (ref 1–3.3)
LYMPHOCYTES NFR BLD AUTO: 33.4 % — SIGNIFICANT CHANGE UP (ref 13–44)
MAGNESIUM SERPL-MCNC: 2.4 MG/DL — SIGNIFICANT CHANGE UP (ref 1.8–2.4)
MCHC RBC-ENTMCNC: 30.9 PG — SIGNIFICANT CHANGE UP (ref 27–34)
MCHC RBC-ENTMCNC: 32.7 G/DL — SIGNIFICANT CHANGE UP (ref 32–36)
MCV RBC AUTO: 94.3 FL — SIGNIFICANT CHANGE UP (ref 80–100)
MONOCYTES # BLD AUTO: 0.62 K/UL — SIGNIFICANT CHANGE UP (ref 0–0.9)
MONOCYTES NFR BLD AUTO: 7.2 % — SIGNIFICANT CHANGE UP (ref 2–14)
NEUTROPHILS # BLD AUTO: 5 K/UL — SIGNIFICANT CHANGE UP (ref 1.8–7.4)
NEUTROPHILS NFR BLD AUTO: 57.9 % — SIGNIFICANT CHANGE UP (ref 43–77)
NRBC # BLD AUTO: 0 K/UL — SIGNIFICANT CHANGE UP (ref 0–0)
NRBC # FLD: 0 K/UL — SIGNIFICANT CHANGE UP (ref 0–0)
NRBC BLD AUTO-RTO: 0 /100 WBCS — SIGNIFICANT CHANGE UP (ref 0–0)
PLATELET # BLD AUTO: 194 K/UL — SIGNIFICANT CHANGE UP (ref 150–400)
PMV BLD: 9.9 FL — SIGNIFICANT CHANGE UP (ref 7–13)
POTASSIUM SERPL-MCNC: 4.1 MMOL/L — SIGNIFICANT CHANGE UP (ref 3.5–5)
POTASSIUM SERPL-SCNC: 4.1 MMOL/L — SIGNIFICANT CHANGE UP (ref 3.5–5)
PROT SERPL-MCNC: 6.8 G/DL — SIGNIFICANT CHANGE UP (ref 6–8)
RBC # BLD: 4.7 M/UL — SIGNIFICANT CHANGE UP (ref 4.2–5.8)
RBC # FLD: 14.3 % — SIGNIFICANT CHANGE UP (ref 10.3–14.5)
SODIUM SERPL-SCNC: 141 MMOL/L — SIGNIFICANT CHANGE UP (ref 135–146)
WBC # BLD: 8.64 K/UL — SIGNIFICANT CHANGE UP (ref 3.8–10.5)
WBC # FLD AUTO: 8.64 K/UL — SIGNIFICANT CHANGE UP (ref 3.8–10.5)

## 2025-06-17 PROCEDURE — 36415 COLL VENOUS BLD VENIPUNCTURE: CPT

## 2025-06-17 PROCEDURE — 85025 COMPLETE CBC W/AUTO DIFF WBC: CPT

## 2025-06-17 PROCEDURE — 80053 COMPREHEN METABOLIC PANEL: CPT

## 2025-06-17 PROCEDURE — 80201 ASSAY OF TOPIRAMATE: CPT

## 2025-06-17 PROCEDURE — 80175 DRUG SCREEN QUAN LAMOTRIGINE: CPT

## 2025-06-17 PROCEDURE — 83735 ASSAY OF MAGNESIUM: CPT

## 2025-06-17 PROCEDURE — 99221 1ST HOSP IP/OBS SF/LOW 40: CPT

## 2025-06-17 PROCEDURE — 80048 BASIC METABOLIC PNL TOTAL CA: CPT

## 2025-06-17 PROCEDURE — 99222 1ST HOSP IP/OBS MODERATE 55: CPT

## 2025-06-17 PROCEDURE — 95700 EEG CONT REC W/VID EEG TECH: CPT

## 2025-06-17 PROCEDURE — 95716 VEEG EA 12-26HR CONT MNTR: CPT

## 2025-06-17 PROCEDURE — 85027 COMPLETE CBC AUTOMATED: CPT

## 2025-06-17 RX ORDER — LORAZEPAM 4 MG/ML
2 VIAL (ML) INJECTION THREE TIMES A DAY
Refills: 0 | Status: DISCONTINUED | OUTPATIENT
Start: 2025-06-17 | End: 2025-06-18

## 2025-06-17 RX ORDER — TOPIRAMATE 25 MG/1
200 TABLET, FILM COATED ORAL EVERY 12 HOURS
Refills: 0 | Status: DISCONTINUED | OUTPATIENT
Start: 2025-06-18 | End: 2025-06-18

## 2025-06-17 RX ORDER — ENOXAPARIN SODIUM 100 MG/ML
40 INJECTION SUBCUTANEOUS EVERY 24 HOURS
Refills: 0 | Status: DISCONTINUED | OUTPATIENT
Start: 2025-06-17 | End: 2025-06-17

## 2025-06-17 RX ORDER — APIXABAN 2.5 MG/1
5 TABLET, FILM COATED ORAL EVERY 12 HOURS
Refills: 0 | Status: DISCONTINUED | OUTPATIENT
Start: 2025-06-17 | End: 2025-06-18

## 2025-06-17 RX ORDER — TOPIRAMATE 25 MG/1
100 TABLET, FILM COATED ORAL ONCE
Refills: 0 | Status: COMPLETED | OUTPATIENT
Start: 2025-06-17 | End: 2025-06-17

## 2025-06-17 RX ORDER — ATORVASTATIN CALCIUM 80 MG/1
40 TABLET, FILM COATED ORAL AT BEDTIME
Refills: 0 | Status: DISCONTINUED | OUTPATIENT
Start: 2025-06-17 | End: 2025-06-18

## 2025-06-17 RX ORDER — ENALAPRIL MALEATE 20 MG
5 TABLET ORAL
Refills: 0 | Status: DISCONTINUED | OUTPATIENT
Start: 2025-06-17 | End: 2025-06-18

## 2025-06-17 RX ORDER — NICOTINE POLACRILEX 4 MG/1
1 GUM, CHEWING ORAL DAILY
Refills: 0 | Status: DISCONTINUED | OUTPATIENT
Start: 2025-06-17 | End: 2025-06-18

## 2025-06-17 RX ORDER — LAMOTRIGINE 150 MG/1
100 TABLET ORAL EVERY 12 HOURS
Refills: 0 | Status: DISCONTINUED | OUTPATIENT
Start: 2025-06-17 | End: 2025-06-18

## 2025-06-17 RX ORDER — METOPROLOL SUCCINATE 50 MG/1
100 TABLET, EXTENDED RELEASE ORAL DAILY
Refills: 0 | Status: DISCONTINUED | OUTPATIENT
Start: 2025-06-17 | End: 2025-06-18

## 2025-06-17 RX ORDER — ACETAMINOPHEN 500 MG/5ML
650 LIQUID (ML) ORAL EVERY 6 HOURS
Refills: 0 | Status: DISCONTINUED | OUTPATIENT
Start: 2025-06-17 | End: 2025-06-18

## 2025-06-17 NOTE — CHART NOTE - NSCHARTNOTEFT_GEN_A_CORE
Patient actually took his own medications tonight but when confronted, he handed over all of his meds. In addition to medications already ordered, he was taking a Z-Pack and tapering dose of Prednisone (currently 40 mg). Also had Ibuprofen 800mg. Patient says he expects to be discharged tomorrow so will not order above listed medications.

## 2025-06-17 NOTE — H&P ADULT - NS ATTEND AMEND GEN_ALL_CORE FT
66 yo male PMHx of cardiomyopathy, BEVERLEY (on biPAP), hypertension, hyperlipidemia, Afib s/p ablation (on Eliquis). s/p ILR removal 1/2025, and recurrent syncope presenting for elective VEEG.     # syncope  -VEEG  -Neurology consult  -Monitor electrolytes, keep Mg >2, K >4   -Seizure precautions   - anti-seizure meds per epilepsy team  -F/U 7 pm labs and lamotrigine/topiramate serum levels     #BEVERLEY  -c/w bipap qhs     #Hypertension  -c/w home enalapril 5 mg BID     #HLD   -c/w statin    #Afib s/p ablation (on Eliquis)  -c/w metoprolol succinate  mg and eliquis 5 BID     #Tobacco use   - Smoking cessation provided   - C/w nicotine patch     Diet: DASH  Activity: AAT  VTE ppx: C/w Eliquis   CHG: ordered

## 2025-06-17 NOTE — H&P ADULT - NSHPPHYSICALEXAM_GEN_ALL_CORE
Vital Signs Last 24 Hrs  T(C): 36.7 (17 Jun 2025 13:02), Max: 36.7 (17 Jun 2025 13:02)  T(F): 98.1 (17 Jun 2025 13:02), Max: 98.1 (17 Jun 2025 13:02)  HR: 66 (17 Jun 2025 13:02) (51 - 66)  BP: 143/84 (17 Jun 2025 13:02) (143/84 - 163/87)    RR: 18 (17 Jun 2025 13:02) (18 - 18)  SpO2: 100% (17 Jun 2025 13:02) (99% - 100%)    PHYSICAL EXAM:  GENERAL: NAD, well-developed  HEAD:  Atraumatic, Normocephalic  EYES: EOMI, PERRLA, conjunctiva and sclera clear  CHEST/LUNG: Clear to auscultation bilaterally; No wheeze  HEART: Regular rate and rhythm; No murmurs, rubs, or gallops. distal pulses 2+ and equal bilateral  ABDOMEN: Soft, Nontender, Nondistended; Bowel sounds present  EXTREMITIES:  No clubbing, cyanosis, or edema  CNS: AAOx3

## 2025-06-17 NOTE — H&P ADULT - HISTORY OF PRESENT ILLNESS
Patient is a 65 year old male with hx of cardiomyopathy, BEVERLEY (on biPAP), hypertension, hyperlipidemia, Afib s/p ablation (on Eliquis). s/p ILR removal 1/2025, and recurrent syncope presenting for elective VEEG. Patient states he has 7-8 syncopal episodes per month x 3-4 years, in the last 3 months has had 1-2 syncopal episodes per month. States unconscious for a few minutes, often unwitnessed, aware of what occurred post episode. No confusion afterward. Pt admits to syncopal episodes beginning at the same time mother was diagnosed with dementia. Notices episodes occur later in the day. Denies visual disturbances prior to episode, denies tongue biting, foaming of the mouth, numbness/ tingling, shaking, confusion, dizziness, HA, SOB, palpitations. Compliant with meds. Active smoker.

## 2025-06-17 NOTE — PATIENT PROFILE ADULT - FALL HARM RISK - UNIVERSAL INTERVENTIONS
Bed in lowest position, wheels locked, appropriate side rails in place/Call bell, personal items and telephone in reach/Instruct patient to call for assistance before getting out of bed or chair/Non-slip footwear when patient is out of bed/[place] to call system/Physically safe environment - no spills, clutter or unnecessary equipment/Purposeful Proactive Rounding/Room/bathroom lighting operational, light cord in reach
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
## 2025-06-17 NOTE — H&P ADULT - ASSESSMENT
This is a 64 yo male PMHx of cardiomyopathy, BEVERLEY (on biPAP), hypertension, hyperlipidemia, Afib s/p ablation (on Eliquis). s/p ILR removal 1/2025, and recurrent syncope presenting for elective VEEG.     # syncope  -VEEG  -Neurology consult  -Monitor electrolytes, keep Mg >2, K >4   -Seizure precautions   - anti-seizure meds per epilepsy team  -F/U 7 pm labs and lamotrigine/topiramate serum levels     #BEVERLEY  -c/w bipap qhs     #Hypertension  -c/w home enalapril 5 mg BID     #HLD   -c/w statin    #Afib s/p ablation (on Eliquis)  -c/w metoprolol succinate  mg and eliquis 5 BID     #Tobacco use   - Smoking cessation provided   - C/w nicotine patch     Diet: DASH  Activity: AAT  VTE ppx: C/w Eliquis   CHG: ordered

## 2025-06-18 ENCOUNTER — TRANSCRIPTION ENCOUNTER (OUTPATIENT)
Age: 65
End: 2025-06-18

## 2025-06-18 VITALS
TEMPERATURE: 98 F | SYSTOLIC BLOOD PRESSURE: 146 MMHG | DIASTOLIC BLOOD PRESSURE: 66 MMHG | OXYGEN SATURATION: 100 % | RESPIRATION RATE: 18 BRPM | HEART RATE: 78 BPM

## 2025-06-18 LAB
ANION GAP SERPL CALC-SCNC: 13 MMOL/L — SIGNIFICANT CHANGE UP (ref 7–14)
BUN SERPL-MCNC: 20 MG/DL — SIGNIFICANT CHANGE UP (ref 10–20)
CALCIUM SERPL-MCNC: 9 MG/DL — SIGNIFICANT CHANGE UP (ref 8.4–10.5)
CHLORIDE SERPL-SCNC: 109 MMOL/L — SIGNIFICANT CHANGE UP (ref 98–110)
CO2 SERPL-SCNC: 18 MMOL/L — SIGNIFICANT CHANGE UP (ref 17–32)
CREAT SERPL-MCNC: 0.8 MG/DL — SIGNIFICANT CHANGE UP (ref 0.7–1.5)
EGFR: 99 ML/MIN/1.73M2 — SIGNIFICANT CHANGE UP
EGFR: 99 ML/MIN/1.73M2 — SIGNIFICANT CHANGE UP
GLUCOSE SERPL-MCNC: 130 MG/DL — HIGH (ref 70–99)
HCT VFR BLD CALC: 46.1 % — SIGNIFICANT CHANGE UP (ref 39–50)
HGB BLD-MCNC: 14.8 G/DL — SIGNIFICANT CHANGE UP (ref 13–17)
MAGNESIUM SERPL-MCNC: 2.4 MG/DL — SIGNIFICANT CHANGE UP (ref 1.8–2.4)
MCHC RBC-ENTMCNC: 30.9 PG — SIGNIFICANT CHANGE UP (ref 27–34)
MCHC RBC-ENTMCNC: 32.1 G/DL — SIGNIFICANT CHANGE UP (ref 32–36)
MCV RBC AUTO: 96.2 FL — SIGNIFICANT CHANGE UP (ref 80–100)
NRBC # BLD AUTO: 0 K/UL — SIGNIFICANT CHANGE UP (ref 0–0)
NRBC # FLD: 0 K/UL — SIGNIFICANT CHANGE UP (ref 0–0)
NRBC BLD AUTO-RTO: 0 /100 WBCS — SIGNIFICANT CHANGE UP (ref 0–0)
PLATELET # BLD AUTO: 193 K/UL — SIGNIFICANT CHANGE UP (ref 150–400)
PMV BLD: 10 FL — SIGNIFICANT CHANGE UP (ref 7–13)
POTASSIUM SERPL-MCNC: 4.9 MMOL/L — SIGNIFICANT CHANGE UP (ref 3.5–5)
POTASSIUM SERPL-SCNC: 4.9 MMOL/L — SIGNIFICANT CHANGE UP (ref 3.5–5)
RBC # BLD: 4.79 M/UL — SIGNIFICANT CHANGE UP (ref 4.2–5.8)
RBC # FLD: 14.4 % — SIGNIFICANT CHANGE UP (ref 10.3–14.5)
SODIUM SERPL-SCNC: 140 MMOL/L — SIGNIFICANT CHANGE UP (ref 135–146)
WBC # BLD: 9.63 K/UL — SIGNIFICANT CHANGE UP (ref 3.8–10.5)
WBC # FLD AUTO: 9.63 K/UL — SIGNIFICANT CHANGE UP (ref 3.8–10.5)

## 2025-06-18 PROCEDURE — 99239 HOSP IP/OBS DSCHRG MGMT >30: CPT

## 2025-06-18 PROCEDURE — 95720 EEG PHY/QHP EA INCR W/VEEG: CPT

## 2025-06-18 PROCEDURE — 99231 SBSQ HOSP IP/OBS SF/LOW 25: CPT

## 2025-06-18 RX ORDER — TOPIRAMATE 25 MG/1
1 TABLET, FILM COATED ORAL
Qty: 60 | Refills: 2
Start: 2025-06-18 | End: 2025-09-15

## 2025-06-18 RX ORDER — TOPIRAMATE 25 MG/1
100 TABLET, FILM COATED ORAL ONCE
Refills: 0 | Status: COMPLETED | OUTPATIENT
Start: 2025-06-18 | End: 2025-06-18

## 2025-06-18 RX ORDER — TOPIRAMATE 25 MG/1
1 TABLET, FILM COATED ORAL
Qty: 60 | Refills: 0
Start: 2025-06-18 | End: 2025-07-17

## 2025-06-18 RX ORDER — LAMOTRIGINE 150 MG/1
1 TABLET ORAL
Refills: 0
Start: 2025-06-18

## 2025-06-18 RX ADMIN — TOPIRAMATE 100 MILLIGRAM(S): 25 TABLET, FILM COATED ORAL at 11:05

## 2025-06-18 RX ADMIN — LAMOTRIGINE 100 MILLIGRAM(S): 150 TABLET ORAL at 09:46

## 2025-06-18 RX ADMIN — Medication 5 MILLIGRAM(S): at 09:46

## 2025-06-18 RX ADMIN — APIXABAN 5 MILLIGRAM(S): 2.5 TABLET, FILM COATED ORAL at 09:47

## 2025-06-18 RX ADMIN — TOPIRAMATE 200 MILLIGRAM(S): 25 TABLET, FILM COATED ORAL at 09:46

## 2025-06-18 RX ADMIN — METOPROLOL SUCCINATE 100 MILLIGRAM(S): 50 TABLET, EXTENDED RELEASE ORAL at 09:46

## 2025-06-18 NOTE — DISCHARGE NOTE PROVIDER - CARE PROVIDERS DIRECT ADDRESSES
[email or web address]

## 2025-06-18 NOTE — DISCHARGE NOTE PROVIDER - CARE PROVIDER_API CALL
Lamonte Her  Neurology  [address] [place] [address], [address] [address]  Phone: [phone]  Fax: [phone]  Scheduled Appointment: 09/08/2025 02:30 PM   Lamonte Her  Neurology  [address] [place] [address], [address]  [place] [address]  Phone: [phone]  Fax: [phone]  Scheduled Appointment: 09/08/2025 02:30 PM    Cheryl Dexter  Internal Medicine  [address] [place] [address]  Phone: [phone]  Fax: [phone]  Follow Up Time: 1 week

## 2025-06-18 NOTE — DISCHARGE NOTE PROVIDER - NSDCMRMEDTOKEN_GEN_ALL_CORE_FT
Eliquis 5 mg oral tablet: 1 tab(s) orally 2 times a day  enalapril 5 mg oral tablet: 1 tab(s) orally 2 times a day  lamoTRIgine 100 mg oral tablet: 1 tab(s) orally 2 times a day  metoprolol succinate 100 mg oral tablet, extended release: 1 tab(s) orally once a day  pravastatin 20 mg oral tablet: 1 tab(s) orally once a day  topiramate 100 mg oral tablet: 1 tab(s) orally every 12 hours  topiramate 200 mg oral tablet: 1 tab(s) orally every 12 hours

## 2025-06-18 NOTE — DISCHARGE NOTE PROVIDER - NSDCCPCAREPLAN_GEN_ALL_CORE_FT
PRINCIPAL DISCHARGE DIAGNOSIS  Diagnosis: Seizure  Assessment and Plan of Treatment: Follow up neurology appointment is scheduled with Dr. Her   for September 8, 2025 at 2:30 pm.  [address] [place] [address], [address]  [phone]  Discharge seizure medications are:  Lamictal 100mg q12hrs (preadmission dose)  Topamax 300mg q12hrs (dose decreased)  Patient was also given Rx for CBC, CMP, ASM levels trough to be done prior to follow up.  Follow up with pulmonary for Bi-PAP optimization/titration.        SECONDARY DISCHARGE DIAGNOSES  Diagnosis: BEVERLEY (obstructive sleep apnea)  Assessment and Plan of Treatment: Follow up with Pulmonologist for BIPAP optimization

## 2025-06-18 NOTE — DISCHARGE NOTE PROVIDER - ATTENDING DISCHARGE PHYSICAL EXAMINATION:
Patient seen and examined 6/18/2025.    Vital Signs Last 24 Hrs  T(C): 36.3 (18 Jun 2025 05:30), Max: 36.8 (17 Jun 2025 20:29)  T(F): 97.3 (18 Jun 2025 05:30), Max: 98.2 (17 Jun 2025 20:29)  HR: 61 (18 Jun 2025 05:30) (61 - 76)  BP: 115/70 (18 Jun 2025 05:30) (115/70 - 162/89)  BP(mean): --  RR: 18 (18 Jun 2025 05:30) (18 - 18)  SpO2: 96% (18 Jun 2025 05:30) (96% - 100%)    Parameters below as of 18 Jun 2025 05:30  Patient On (Oxygen Delivery Method): room air    PHYSICAL EXAM:  GENERAL: NAD, sitting up in bed  PSYCH: no agitation, baseline mentation  NERVOUS SYSTEM:  Alert, freely moving all extremities  PULMONARY: Clear to percussion bilaterally  CARDIOVASCULAR: Regular rate and rhythm  GI: Soft, Nontender  EXTREMITIES:  No cyanosis or edema  SKIN: No obvious rashes or lesions

## 2025-06-18 NOTE — DISCHARGE NOTE PROVIDER - NSDCFUSCHEDAPPT_GEN_ALL_CORE_FT
Jeff Soto  [facility]  PULMMED [address]  Scheduled Appointment: 07/01/2025    Lamonte Her  [facility]  NEUROLOGY [address]  Scheduled Appointment: 09/08/2025    [facility]  CARDIOLOGY [address]  Scheduled Appointment: 09/10/2025

## 2025-06-18 NOTE — DISCHARGE NOTE NURSING/CASE MANAGEMENT/SOCIAL WORK - PATIENT PORTAL LINK FT
You can access the FollowMyHealth Patient Portal offered by [facility] by registering at the following website: [email or web address]. By joining [employer]’s FollowMyHealth portal, you will also be able to view your health information using other applications (apps) compatible with our system.

## 2025-06-18 NOTE — DISCHARGE NOTE PROVIDER - NSDCACTIVITY_GEN_ALL_CORE
EKOS CATHETER INTACT MACHINE  ON  LIGHTS ON, SITE OOZING MOD BLOODY QUICK CLOT AND PRESSURE DRESSING APPLIED Activity as tolerated

## 2025-06-18 NOTE — PROGRESS NOTE ADULT - SUBJECTIVE AND OBJECTIVE BOX
Epilepsy Attending Note:     CARA YEUNG    65y Male  MRN [id number]    Vital Signs Last 24 Hrs  T(C): 36.3 (18 Jun 2025 05:30), Max: 36.8 (17 Jun 2025 20:29)  T(F): 97.3 (18 Jun 2025 05:30), Max: 98.2 (17 Jun 2025 20:29)  HR: 61 (18 Jun 2025 05:30) (61 - 76)  BP: 115/70 (18 Jun 2025 05:30) (115/70 - 162/89)  BP(mean): --  RR: 18 (18 Jun 2025 05:30) (18 - 18)  SpO2: 96% (18 Jun 2025 05:30) (96% - 100%)    Parameters below as of 18 Jun 2025 05:30  Patient On (Oxygen Delivery Method): room air                              14.8   9.63  )-----------( 193      ( 18 Jun 2025 07:30 )             46.1       06-18    140  |  109  |  20  ----------------------------<  130[H]  4.9   |  18  |  0.8    Ca    9.0      18 Jun 2025 07:30  Mg     2.4     06-18    TPro  6.8  /  Alb  4.5  /  TBili  0.7  /  DBili  x   /  AST  15  /  ALT  15  /  AlkPhos  98  06-17      MEDICATIONS  (STANDING):  apixaban 5 milliGRAM(s) Oral every 12 hours  atorvastatin 40 milliGRAM(s) Oral at bedtime  chlorhexidine 2% Cloths 1 Application(s) Topical <User Schedule>  enalapril 5 milliGRAM(s) Oral two times a day  lamoTRIgine 100 milliGRAM(s) Oral every 12 hours  metoprolol succinate  milliGRAM(s) Oral daily  nicotine - 21 mG/24Hr(s) Patch 1 Patch Transdermal daily  topiramate 200 milliGRAM(s) Oral every 12 hours  topiramate 100 milliGRAM(s) Oral once    MEDICATIONS  (PRN):  acetaminophen     Tablet .. 650 milliGRAM(s) Oral every 6 hours PRN Temp greater or equal to 38C (100.4F), Mild Pain (1 - 3)  LORazepam   Injectable 2 milliGRAM(s) IV Push three times a day PRN generalized tonic-clonic seizure lasting longer than 2 minutes, or two consecutive seizures without return to baseline in-between          VEEG in the last 24 hours:    Background - continuous, symmetrical, well organized, reaching frequencies in the range of 8-9 Hz, showing good modulation and reactivity, normal sleep patterns. Normal response to HV/PS.     Focal and generalized slowing - none    Interictal activity - none    Events - none    Seizures - none    Impression: Normal awake/ drowsy/ sleep VEEG x 24 hrs. Patient does not want to continue VEEG, wants to be discharged today. ASM levels trough sent.    Plan -   Will discontinue monitoring  Discharge on same dose of Lamictal and lower dose of Topamax 300mg q12hrs  Follow up with pulmonology for optimization of Bi-PAP  Follow up as scheduled with level

## 2025-06-18 NOTE — DISCHARGE NOTE PROVIDER - PROVIDER TOKENS
PROVIDER:[TOKEN:[[id number]],SCHEDULEDAPPT:[09/08/2025],SCHEDULEDAPPTTIME:[02:30 PM]] PROVIDER:[TOKEN:[[id number]],SCHEDULEDAPPT:[09/08/2025],SCHEDULEDAPPTTIME:[02:30 PM]],PROVIDER:[TOKEN:[[id number]],FOLLOWUP:[1 week]]

## 2025-06-18 NOTE — PROGRESS NOTE ADULT - SUBJECTIVE AND OBJECTIVE BOX
Epilepsy Team Discharge Note:    VEEG monitoring completed, patient is cleared for discharge from neurology standpoint.    Follow up neurology appointment is scheduled with Dr. Her   for September 8, 2025 at 2:30 pm.    [address] [place] [address], [address]  [phone]    Discharge seizure medications are:  Lamictal 100mg q12hrs (preadmission dose)  Topamax 300mg q12hrs (dose decreased)    Rx sent to the pharmacy.    Patient was also given Rx for CBC, CMP, ASM levels trough to be done prior to follow up.    Follow up with pulmonary for Bi-PAP optimization/titration.    Detailed written and verbal instructions regarding seizure precautions and follow up plan are given to the patient, all questions answered. Patient verbalized understanding.    Discussed with medical and nursing teams.

## 2025-06-18 NOTE — DISCHARGE NOTE NURSING/CASE MANAGEMENT/SOCIAL WORK - FINANCIAL ASSISTANCE
[facility] provides services at a reduced cost to those who are determined to be eligible through [facility]’s financial assistance program. Information regarding [facility]’s financial assistance program can be found by going to [email or web address] or by calling [phone].

## 2025-06-18 NOTE — DISCHARGE NOTE PROVIDER - HOSPITAL COURSE
· Assessment	  This is a 64 yo male PMHx of cardiomyopathy, BEVERLEY (on biPAP), hypertension, hyperlipidemia, Afib s/p ablation (on Eliquis). s/p ILR removal 1/2025, and recurrent syncope presenting for elective VEEG.     # syncope  -VEEG  -Neurology consult: Dr Her  -Monitored electrolytes  -Seizure precautions followed  - anti-seizure meds per epilepsy team    ** VEEG monitoring completed, patient is cleared for discharge from neurology standpoint.    **Impression: Normal awake/ drowsy/ sleep VEEG x 24 hrs. Patient does not want to continue VEEG, wants to be discharged today. ASM levels trough sent.    Follow up neurology appointment is scheduled with Dr. Her   for September 8, 2025 at 2:30 pm.    [address] [place] [address], [address]  [phone]    Discharge seizure medications are:  Lamictal 100mg q12hrs (preadmission dose)  Topamax 300mg q12hrs (dose decreased)    Patient was also given Rx for CBC, CMP, ASM levels trough to be done prior to follow up.    Follow up with pulmonary for Bi-PAP optimization/titration.      #BEVERLEY  -c/w bipap qhs     #Hypertension  -c/w home enalapril 5 mg BID     #HLD   -c/w statin   Admission HPI:  Patient is a 65 year old male with hx of cardiomyopathy, BEVERLEY (on biPAP), hypertension, hyperlipidemia, Afib s/p ablation (on Eliquis). s/p ILR removal 1/2025, and recurrent syncope presenting for elective VEEG. Patient states he has 7-8 syncopal episodes per month x 3-4 years, in the last 3 months has had 1-2 syncopal episodes per month. States unconscious for a few minutes, often unwitnessed, aware of what occurred post episode. No confusion afterward. Pt admits to syncopal episodes beginning at the same time mother was diagnosed with dementia. Notices episodes occur later in the day. Denies visual disturbances prior to episode, denies tongue biting, foaming of the mouth, numbness/ tingling, shaking, confusion, dizziness, HA, SOB, palpitations. Compliant with meds. Active smoker.    Hospital course:  Neurology was consulted.  Patient was monitored on video EEG overnight with no acute events.  He is medically stable for discharge on a lower dose of topamax to follow up with his outpatient neurologist.  He may continue his outpatient Z-pack and steroid for bronchitis and should follow up with his primary care physician.    # syncope  -VEEG  -Neurology consult: Dr Her  -Monitored electrolytes  -Seizure precautions followed  - anti-seizure meds per epilepsy team    ** VEEG monitoring completed, patient is cleared for discharge from neurology standpoint.    **Impression: Normal awake/ drowsy/ sleep VEEG x 24 hrs. Patient does not want to continue VEEG, wants to be discharged today. ASM levels trough sent.    Follow up neurology appointment is scheduled with Dr. Her   for September 8, 2025 at 2:30 pm.    [address] [place] [address], [address]  [phone]    Discharge seizure medications are:  Lamictal 100mg q12hrs (preadmission dose)  Topamax 300mg q12hrs (dose decreased)    Patient was also given Rx for CBC, CMP, ASM levels trough to be done prior to follow up.    Follow up with pulmonary for Bi-PAP optimization/titration.    #BEVERLEY  -c/w bipap qhs     #Hypertension  -c/w home enalapril 5 mg BID     #HLD   -c/w statin

## 2025-06-20 LAB — LAMOTRIGINE SERPL-MCNC: 1.6 UG/ML — LOW (ref 2–20)

## 2025-06-21 LAB — TOPIRAMATE SERPL-MCNC: 14.7 MCG/ML — SIGNIFICANT CHANGE UP

## 2025-06-25 ENCOUNTER — EMERGENCY (EMERGENCY)
Facility: HOSPITAL | Age: 65
LOS: 0 days | Discharge: HOME | End: 2025-06-25
Attending: STUDENT IN AN ORGANIZED HEALTH CARE EDUCATION/TRAINING PROGRAM
Payer: MEDICARE

## 2025-06-25 ENCOUNTER — NON-APPOINTMENT (OUTPATIENT)
Age: 65
End: 2025-06-25

## 2025-06-25 VITALS
OXYGEN SATURATION: 100 % | SYSTOLIC BLOOD PRESSURE: 156 MMHG | HEART RATE: 57 BPM | TEMPERATURE: 99 F | HEIGHT: 67 IN | RESPIRATION RATE: 18 BRPM | WEIGHT: 253.09 LBS | DIASTOLIC BLOOD PRESSURE: 83 MMHG

## 2025-06-25 DIAGNOSIS — Z79.01 LONG TERM (CURRENT) USE OF ANTICOAGULANTS: ICD-10-CM

## 2025-06-25 DIAGNOSIS — G47.33 OBSTRUCTIVE SLEEP APNEA (ADULT) (PEDIATRIC): ICD-10-CM

## 2025-06-25 DIAGNOSIS — R56.9 UNSPECIFIED CONVULSIONS: ICD-10-CM

## 2025-06-25 DIAGNOSIS — I42.9 CARDIOMYOPATHY, UNSPECIFIED: ICD-10-CM

## 2025-06-25 DIAGNOSIS — F17.210 NICOTINE DEPENDENCE, CIGARETTES, UNCOMPLICATED: ICD-10-CM

## 2025-06-25 DIAGNOSIS — E78.5 HYPERLIPIDEMIA, UNSPECIFIED: ICD-10-CM

## 2025-06-25 DIAGNOSIS — R55 SYNCOPE AND COLLAPSE: ICD-10-CM

## 2025-06-25 DIAGNOSIS — Z98.890 OTHER SPECIFIED POSTPROCEDURAL STATES: Chronic | ICD-10-CM

## 2025-06-25 DIAGNOSIS — I25.10 ATHEROSCLEROTIC HEART DISEASE OF NATIVE CORONARY ARTERY WITHOUT ANGINA PECTORIS: ICD-10-CM

## 2025-06-25 DIAGNOSIS — I10 ESSENTIAL (PRIMARY) HYPERTENSION: ICD-10-CM

## 2025-06-25 DIAGNOSIS — I48.0 PAROXYSMAL ATRIAL FIBRILLATION: ICD-10-CM

## 2025-06-25 LAB
ALBUMIN SERPL ELPH-MCNC: 4.4 G/DL — SIGNIFICANT CHANGE UP (ref 3.5–5.2)
ALP SERPL-CCNC: 102 U/L — SIGNIFICANT CHANGE UP (ref 30–115)
ALT FLD-CCNC: 19 U/L — SIGNIFICANT CHANGE UP (ref 0–41)
ANION GAP SERPL CALC-SCNC: 10 MMOL/L — SIGNIFICANT CHANGE UP (ref 7–14)
APTT BLD: 36.8 SEC — SIGNIFICANT CHANGE UP (ref 27–39.2)
AST SERPL-CCNC: 29 U/L — SIGNIFICANT CHANGE UP (ref 0–41)
BASOPHILS # BLD AUTO: 0.04 K/UL — SIGNIFICANT CHANGE UP (ref 0–0.2)
BASOPHILS NFR BLD AUTO: 0.5 % — SIGNIFICANT CHANGE UP (ref 0–2)
BILIRUB SERPL-MCNC: 0.4 MG/DL — SIGNIFICANT CHANGE UP (ref 0.2–1.2)
BUN SERPL-MCNC: 13 MG/DL — SIGNIFICANT CHANGE UP (ref 10–20)
CALCIUM SERPL-MCNC: 8.9 MG/DL — SIGNIFICANT CHANGE UP (ref 8.4–10.5)
CHLORIDE SERPL-SCNC: 107 MMOL/L — SIGNIFICANT CHANGE UP (ref 98–110)
CO2 SERPL-SCNC: 24 MMOL/L — SIGNIFICANT CHANGE UP (ref 17–32)
CREAT SERPL-MCNC: 0.9 MG/DL — SIGNIFICANT CHANGE UP (ref 0.7–1.5)
EGFR: 96 ML/MIN/1.73M2 — SIGNIFICANT CHANGE UP
EGFR: 96 ML/MIN/1.73M2 — SIGNIFICANT CHANGE UP
EOSINOPHIL # BLD AUTO: 0.04 K/UL — SIGNIFICANT CHANGE UP (ref 0–0.5)
EOSINOPHIL NFR BLD AUTO: 0.5 % — SIGNIFICANT CHANGE UP (ref 0–6)
GLUCOSE SERPL-MCNC: 99 MG/DL — SIGNIFICANT CHANGE UP (ref 70–99)
HCT VFR BLD CALC: 45.2 % — SIGNIFICANT CHANGE UP (ref 39–50)
HGB BLD-MCNC: 14.4 G/DL — SIGNIFICANT CHANGE UP (ref 13–17)
IMM GRANULOCYTES # BLD AUTO: 0.06 K/UL — SIGNIFICANT CHANGE UP (ref 0–0.07)
IMM GRANULOCYTES NFR BLD AUTO: 0.7 % — SIGNIFICANT CHANGE UP (ref 0–0.9)
INR BLD: 0.97 RATIO — SIGNIFICANT CHANGE UP (ref 0.65–1.3)
LYMPHOCYTES # BLD AUTO: 1.66 K/UL — SIGNIFICANT CHANGE UP (ref 1–3.3)
LYMPHOCYTES NFR BLD AUTO: 20.7 % — SIGNIFICANT CHANGE UP (ref 13–44)
MAGNESIUM SERPL-MCNC: 2.3 MG/DL — SIGNIFICANT CHANGE UP (ref 1.8–2.4)
MCHC RBC-ENTMCNC: 30.8 PG — SIGNIFICANT CHANGE UP (ref 27–34)
MCHC RBC-ENTMCNC: 31.9 G/DL — LOW (ref 32–36)
MCV RBC AUTO: 96.6 FL — SIGNIFICANT CHANGE UP (ref 80–100)
MONOCYTES # BLD AUTO: 0.63 K/UL — SIGNIFICANT CHANGE UP (ref 0–0.9)
MONOCYTES NFR BLD AUTO: 7.9 % — SIGNIFICANT CHANGE UP (ref 2–14)
NEUTROPHILS # BLD AUTO: 5.58 K/UL — SIGNIFICANT CHANGE UP (ref 1.8–7.4)
NEUTROPHILS NFR BLD AUTO: 69.7 % — SIGNIFICANT CHANGE UP (ref 43–77)
NRBC # BLD AUTO: 0 K/UL — SIGNIFICANT CHANGE UP (ref 0–0)
NRBC # FLD: 0 K/UL — SIGNIFICANT CHANGE UP (ref 0–0)
NRBC BLD AUTO-RTO: 0 /100 WBCS — SIGNIFICANT CHANGE UP (ref 0–0)
PLATELET # BLD AUTO: 181 K/UL — SIGNIFICANT CHANGE UP (ref 150–400)
PMV BLD: 9.7 FL — SIGNIFICANT CHANGE UP (ref 7–13)
POTASSIUM SERPL-MCNC: 4.8 MMOL/L — SIGNIFICANT CHANGE UP (ref 3.5–5)
POTASSIUM SERPL-SCNC: 4.8 MMOL/L — SIGNIFICANT CHANGE UP (ref 3.5–5)
PROT SERPL-MCNC: 6.4 G/DL — SIGNIFICANT CHANGE UP (ref 6–8)
PROTHROM AB SERPL-ACNC: 11.4 SEC — SIGNIFICANT CHANGE UP (ref 9.95–12.87)
RBC # BLD: 4.68 M/UL — SIGNIFICANT CHANGE UP (ref 4.2–5.8)
RBC # FLD: 14.6 % — HIGH (ref 10.3–14.5)
SODIUM SERPL-SCNC: 141 MMOL/L — SIGNIFICANT CHANGE UP (ref 135–146)
WBC # BLD: 8.01 K/UL — SIGNIFICANT CHANGE UP (ref 3.8–10.5)
WBC # FLD AUTO: 8.01 K/UL — SIGNIFICANT CHANGE UP (ref 3.8–10.5)

## 2025-06-25 PROCEDURE — 70450 CT HEAD/BRAIN W/O DYE: CPT

## 2025-06-25 PROCEDURE — 85610 PROTHROMBIN TIME: CPT

## 2025-06-25 PROCEDURE — 93005 ELECTROCARDIOGRAM TRACING: CPT

## 2025-06-25 PROCEDURE — 99285 EMERGENCY DEPT VISIT HI MDM: CPT | Mod: 25

## 2025-06-25 PROCEDURE — 70450 CT HEAD/BRAIN W/O DYE: CPT | Mod: 26

## 2025-06-25 PROCEDURE — 85025 COMPLETE CBC W/AUTO DIFF WBC: CPT

## 2025-06-25 PROCEDURE — 85730 THROMBOPLASTIN TIME PARTIAL: CPT

## 2025-06-25 PROCEDURE — 93010 ELECTROCARDIOGRAM REPORT: CPT

## 2025-06-25 PROCEDURE — 36415 COLL VENOUS BLD VENIPUNCTURE: CPT

## 2025-06-25 PROCEDURE — 80053 COMPREHEN METABOLIC PANEL: CPT

## 2025-06-25 PROCEDURE — 99285 EMERGENCY DEPT VISIT HI MDM: CPT

## 2025-06-25 PROCEDURE — 83735 ASSAY OF MAGNESIUM: CPT

## 2025-06-25 RX ORDER — LAMOTRIGINE 150 MG/1
200 TABLET ORAL ONCE
Refills: 0 | Status: COMPLETED | OUTPATIENT
Start: 2025-06-25 | End: 2025-06-25

## 2025-06-25 RX ADMIN — LAMOTRIGINE 200 MILLIGRAM(S): 150 TABLET ORAL at 19:59

## 2025-06-25 NOTE — ED PROVIDER NOTE - OBJECTIVE STATEMENT
patient c/o 2 sz today, H/o frequent breakthrough sz,   today 2 tonic clonic, ran out of lamotrigine 5 days ago, Has Rx waiting to

## 2025-06-25 NOTE — ED PROVIDER NOTE - DIFFERENTIAL DIAGNOSIS
Differential Diagnosis differential dx includes but is not limited to:  med noncompliance seizure, ICH, arrhythmia, electrolyte derangement

## 2025-06-25 NOTE — ED ADULT NURSE NOTE - NSFALLHARMRISKINTERV_ED_ALL_ED

## 2025-06-25 NOTE — ED PROVIDER NOTE - CARE PROVIDER_API CALL
Lamonte Her  Neurology  [address] [place] [address], [address] [address]  Phone: [phone]  Fax: [phone]  Follow Up Time:

## 2025-06-25 NOTE — ED PROVIDER NOTE - CLINICAL SUMMARY MEDICAL DECISION MAKING FREE TEXT BOX
Pt here with GTC seizure today in setting of missing lamictal for past 5-6 days. Vitals wnl in ED. No focal neuro deficits. Pt hit head during fall and on eliquis. Plan for labs, ekg, imaging r/o ICH, arrhythmia, electrolyte derangement. Given night time dose of lamictal here. Labs wnl. Ekg with sinus arrhythmia, no ischemic changes. CT head negative for acute pathology. Pt says his lamictal rx is ready for pickup tomorrow morning. He will call his neurologist for sooner appt and feels well to go home. Considered observation vs admission however pt is a good candidate for discharge home with outpatient f/u given that no life threatening pathology found in ED and pt has close outpatient f/u. Strict ED return precautions given. Pt verbalized understanding and was agreeable with plan.

## 2025-06-25 NOTE — ED ADULT TRIAGE NOTE - TEMPERATURE IN FAHRENHEIT (DEGREES F)
98.6
Pt is a 75 y/o female with dx of elective L TKR with h/o L knee OA under regional anesthesia seen pod # 0.

## 2025-06-25 NOTE — ED PROVIDER NOTE - PATIENT PORTAL LINK FT
You can access the FollowMyHealth Patient Portal offered by [facility] by registering at the following website: [email or web address]. By joining [employer]’s FollowMyHealth portal, you will also be able to view your health information using other applications (apps) compatible with our system.

## 2025-06-25 NOTE — ED PROVIDER NOTE - ATTENDING APP SHARED VISIT CONTRIBUTION OF CARE
64 yo M with hx of cardiomyopathy, BEVERLEY on bipap, HTN, HLD, afib s/p ablation on eliquis, recurrent syncope s/p ILR with removal, seizure who presents after witnessed seizure today. Per chart review, pt was admitted to EMU 6/17-6/18 for vEEG and seen by neurologist Dr. Her who cleared for discharge. He was supposed to take lamictal 100mg BID and topamax 300mg BID per discharge note. Pt says he was told by his other neurologist Dr. Treadwell who had increased his lamictal 100mg BID to 100mg in the morning and 200mg at night however did not send refill in time so he has been out of his lamictal for past 5-6 days. He has been taking his topamax 200mg BID (despite note saying 300mg BID). He called his neurologist today around 4pm to send in refill for lamictal. Around 5:15pm he had seizure and fall witnessed by wife who recorded him on camera. Pt was lying on ground with 1 arm up, 1 arm down, and tonic clonic motion lasting ~1-2 min. Currently feels fine. No fever, headache, blurry vision, slurred speech, unilateral weakness, numbness, difficulty walking/swallowing, cp, sob, nausea, vomiting. Has next neurology appt on 9/8.    PMD Dr. Dexter  Neuro Dr. Her/Dr. Treadwell    CONSTITUTIONAL: well developed, in no acute distress, speaking in full sentences, nontoxic appearing  SKIN: warm, dry, no rash  HEAD: normocephalic, atraumatic  EYES: PERRL at 3mm, EOMI, no conjunctival erythema, no spontaneous nystagmus, no provoked nystagmus  ENT: patent airway, moist mucous membranes, no tongue deviation  NECK: supple, no masses, full flexion/extension without pain  CV:  regular rate, regular rhythm, 2+ radial pulses bilaterally  RESP: no wheezes, no rales, no rhonchi, normal work of breathing  ABD: soft, no tenderness, nondistended, no rebound, no guarding  MSK: no cyanosis, no edema  NEURO: alert, oriented, CN 2-12 grossly intact, sensation intact to light touch symmetrically, 5/5 motor strength in all extremities, normal finger to nose, no pronator drift, no facial asymmetry, normal gait  PSYCH: cooperative, appropriate    A&P:  Pt here with GTC seizure today in setting of missing lamictal for past 5-6 days. Vitals wnl in ED. No focal neuro deficits. Pt hit head during fall and on eliquis. Plan for labs, ekg, imaging r/o ICH, arrhythmia, electrolyte derangement.

## 2025-06-25 NOTE — ED PROVIDER NOTE - CONSIDERATION OF ADMISSION OBSERVATION
Consideration of Admission/Observation Considered observation vs admission however pt is a good candidate for discharge home with outpatient f/u given that no life threatening pathology found in ED
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
## 2025-06-27 DIAGNOSIS — F17.200 NICOTINE DEPENDENCE, UNSPECIFIED, UNCOMPLICATED: ICD-10-CM

## 2025-06-27 DIAGNOSIS — Z79.01 LONG TERM (CURRENT) USE OF ANTICOAGULANTS: ICD-10-CM
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
## 2025-06-27 DIAGNOSIS — I42.9 CARDIOMYOPATHY, UNSPECIFIED: ICD-10-CM

## 2025-06-27 DIAGNOSIS — I49.8 OTHER SPECIFIED CARDIAC ARRHYTHMIAS: ICD-10-CM

## 2025-06-27 DIAGNOSIS — G47.33 OBSTRUCTIVE SLEEP APNEA (ADULT) (PEDIATRIC): ICD-10-CM

## 2025-06-27 DIAGNOSIS — I10 ESSENTIAL (PRIMARY) HYPERTENSION: ICD-10-CM

## 2025-06-27 DIAGNOSIS — R56.9 UNSPECIFIED CONVULSIONS: ICD-10-CM

## 2025-06-27 DIAGNOSIS — Z91.199 PATIENT'S NONCOMPLIANCE WITH OTHER MEDICAL TREATMENT AND REGIMEN DUE TO UNSPECIFIED REASON: ICD-10-CM

## 2025-06-27 DIAGNOSIS — T42.6X6A UNDERDOSING OF OTHER ANTIEPILEPTIC AND SEDATIVE-HYPNOTIC DRUGS, INITIAL ENCOUNTER: ICD-10-CM

## 2025-06-27 DIAGNOSIS — I48.91 UNSPECIFIED ATRIAL FIBRILLATION: ICD-10-CM

## 2025-06-27 DIAGNOSIS — E78.5 HYPERLIPIDEMIA, UNSPECIFIED: ICD-10-CM

## 2025-07-01 ENCOUNTER — APPOINTMENT (OUTPATIENT)
Dept: PULMONOLOGY | Facility: CLINIC | Age: 65
End: 2025-07-01
Payer: MEDICARE

## 2025-07-01 VITALS
DIASTOLIC BLOOD PRESSURE: 64 MMHG | HEART RATE: 86 BPM | WEIGHT: 253 LBS | OXYGEN SATURATION: 98 % | HEIGHT: 67 IN | BODY MASS INDEX: 39.71 KG/M2 | SYSTOLIC BLOOD PRESSURE: 122 MMHG

## 2025-07-01 PROCEDURE — 99213 OFFICE O/P EST LOW 20 MIN: CPT

## 2025-07-01 PROCEDURE — G2211 COMPLEX E/M VISIT ADD ON: CPT

## 2025-07-25 ENCOUNTER — LABORATORY RESULT (OUTPATIENT)
Age: 65
End: 2025-07-25

## 2025-08-04 ENCOUNTER — INPATIENT (INPATIENT)
Facility: HOSPITAL | Age: 65
LOS: 2 days | Discharge: HOME | DRG: 101 | End: 2025-08-07
Attending: STUDENT IN AN ORGANIZED HEALTH CARE EDUCATION/TRAINING PROGRAM | Admitting: PSYCHIATRY & NEUROLOGY
Payer: MEDICARE

## 2025-08-04 ENCOUNTER — APPOINTMENT (OUTPATIENT)
Age: 65
End: 2025-08-04
Payer: MEDICARE

## 2025-08-04 VITALS
OXYGEN SATURATION: 98 % | DIASTOLIC BLOOD PRESSURE: 71 MMHG | HEART RATE: 55 BPM | TEMPERATURE: 99 F | RESPIRATION RATE: 18 BRPM | SYSTOLIC BLOOD PRESSURE: 127 MMHG

## 2025-08-04 DIAGNOSIS — Z98.890 OTHER SPECIFIED POSTPROCEDURAL STATES: Chronic | ICD-10-CM

## 2025-08-04 DIAGNOSIS — G40.909 EPILEPSY, UNSPECIFIED, NOT INTRACTABLE, WITHOUT STATUS EPILEPTICUS: ICD-10-CM

## 2025-08-04 LAB
ALBUMIN SERPL ELPH-MCNC: 4.3 G/DL — SIGNIFICANT CHANGE UP (ref 3.5–5.2)
ALP SERPL-CCNC: 105 U/L — SIGNIFICANT CHANGE UP (ref 30–115)
ALT FLD-CCNC: 12 U/L — SIGNIFICANT CHANGE UP (ref 0–41)
ANION GAP SERPL CALC-SCNC: 13 MMOL/L — SIGNIFICANT CHANGE UP (ref 7–14)
AST SERPL-CCNC: 11 U/L — SIGNIFICANT CHANGE UP (ref 0–41)
BASOPHILS # BLD AUTO: 0.05 K/UL — SIGNIFICANT CHANGE UP (ref 0–0.2)
BASOPHILS NFR BLD AUTO: 0.6 % — SIGNIFICANT CHANGE UP (ref 0–1)
BILIRUB SERPL-MCNC: 0.3 MG/DL — SIGNIFICANT CHANGE UP (ref 0.2–1.2)
BUN SERPL-MCNC: 16 MG/DL — SIGNIFICANT CHANGE UP (ref 10–20)
CALCIUM SERPL-MCNC: 8.8 MG/DL — SIGNIFICANT CHANGE UP (ref 8.4–10.5)
CHLORIDE SERPL-SCNC: 109 MMOL/L — SIGNIFICANT CHANGE UP (ref 98–110)
CO2 SERPL-SCNC: 20 MMOL/L — SIGNIFICANT CHANGE UP (ref 17–32)
CREAT SERPL-MCNC: 0.9 MG/DL — SIGNIFICANT CHANGE UP (ref 0.7–1.5)
EGFR: 96 ML/MIN/1.73M2 — SIGNIFICANT CHANGE UP
EGFR: 96 ML/MIN/1.73M2 — SIGNIFICANT CHANGE UP
EOSINOPHIL # BLD AUTO: 0.09 K/UL — SIGNIFICANT CHANGE UP (ref 0–0.7)
EOSINOPHIL NFR BLD AUTO: 1.1 % — SIGNIFICANT CHANGE UP (ref 0–8)
GLUCOSE SERPL-MCNC: 80 MG/DL — SIGNIFICANT CHANGE UP (ref 70–99)
HCT VFR BLD CALC: 42.3 % — SIGNIFICANT CHANGE UP (ref 42–52)
HGB BLD-MCNC: 13.6 G/DL — LOW (ref 14–18)
IMM GRANULOCYTES NFR BLD AUTO: 0.4 % — HIGH (ref 0.1–0.3)
LYMPHOCYTES # BLD AUTO: 2.79 K/UL — SIGNIFICANT CHANGE UP (ref 1.2–3.4)
LYMPHOCYTES # BLD AUTO: 35.2 % — SIGNIFICANT CHANGE UP (ref 20.5–51.1)
MAGNESIUM SERPL-MCNC: 2.4 MG/DL — SIGNIFICANT CHANGE UP (ref 1.8–2.4)
MCHC RBC-ENTMCNC: 31.5 PG — HIGH (ref 27–31)
MCHC RBC-ENTMCNC: 32.2 G/DL — SIGNIFICANT CHANGE UP (ref 32–37)
MCV RBC AUTO: 97.9 FL — HIGH (ref 80–94)
MONOCYTES # BLD AUTO: 0.64 K/UL — HIGH (ref 0.1–0.6)
MONOCYTES NFR BLD AUTO: 8.1 % — SIGNIFICANT CHANGE UP (ref 1.7–9.3)
NEUTROPHILS # BLD AUTO: 4.33 K/UL — SIGNIFICANT CHANGE UP (ref 1.4–6.5)
NEUTROPHILS NFR BLD AUTO: 54.6 % — SIGNIFICANT CHANGE UP (ref 42.2–75.2)
NRBC BLD AUTO-RTO: 0 /100 WBCS — SIGNIFICANT CHANGE UP (ref 0–0)
PLATELET # BLD AUTO: 190 K/UL — SIGNIFICANT CHANGE UP (ref 130–400)
PMV BLD: 10.3 FL — SIGNIFICANT CHANGE UP (ref 7.4–10.4)
POTASSIUM SERPL-MCNC: 4.4 MMOL/L — SIGNIFICANT CHANGE UP (ref 3.5–5)
POTASSIUM SERPL-SCNC: 4.4 MMOL/L — SIGNIFICANT CHANGE UP (ref 3.5–5)
PROT SERPL-MCNC: 6.5 G/DL — SIGNIFICANT CHANGE UP (ref 6–8)
RBC # BLD: 4.32 M/UL — LOW (ref 4.7–6.1)
RBC # FLD: 14.9 % — HIGH (ref 11.5–14.5)
SODIUM SERPL-SCNC: 142 MMOL/L — SIGNIFICANT CHANGE UP (ref 135–146)
WBC # BLD: 7.93 K/UL — SIGNIFICANT CHANGE UP (ref 4.8–10.8)
WBC # FLD AUTO: 7.93 K/UL — SIGNIFICANT CHANGE UP (ref 4.8–10.8)

## 2025-08-04 PROCEDURE — 80175 DRUG SCREEN QUAN LAMOTRIGINE: CPT

## 2025-08-04 PROCEDURE — 83735 ASSAY OF MAGNESIUM: CPT

## 2025-08-04 PROCEDURE — 36415 COLL VENOUS BLD VENIPUNCTURE: CPT

## 2025-08-04 PROCEDURE — 85025 COMPLETE CBC W/AUTO DIFF WBC: CPT

## 2025-08-04 PROCEDURE — 95716 VEEG EA 12-26HR CONT MNTR: CPT

## 2025-08-04 PROCEDURE — 80053 COMPREHEN METABOLIC PANEL: CPT

## 2025-08-04 PROCEDURE — 95700 EEG CONT REC W/VID EEG TECH: CPT

## 2025-08-04 PROCEDURE — 80201 ASSAY OF TOPIRAMATE: CPT

## 2025-08-04 RX ORDER — METOPROLOL SUCCINATE 50 MG/1
100 TABLET, EXTENDED RELEASE ORAL AT BEDTIME
Refills: 0 | Status: DISCONTINUED | OUTPATIENT
Start: 2025-08-04 | End: 2025-08-07

## 2025-08-04 RX ORDER — ATORVASTATIN CALCIUM 80 MG/1
10 TABLET, FILM COATED ORAL AT BEDTIME
Refills: 0 | Status: DISCONTINUED | OUTPATIENT
Start: 2025-08-04 | End: 2025-08-07

## 2025-08-04 RX ORDER — LORAZEPAM 4 MG/ML
2 VIAL (ML) INJECTION THREE TIMES A DAY
Refills: 0 | Status: DISCONTINUED | OUTPATIENT
Start: 2025-08-04 | End: 2025-08-07

## 2025-08-04 RX ORDER — LAMOTRIGINE 150 MG/1
100 TABLET ORAL EVERY 12 HOURS
Refills: 0 | Status: DISCONTINUED | OUTPATIENT
Start: 2025-08-04 | End: 2025-08-07

## 2025-08-04 RX ORDER — ENALAPRIL MALEATE 20 MG
5 TABLET ORAL EVERY 12 HOURS
Refills: 0 | Status: DISCONTINUED | OUTPATIENT
Start: 2025-08-04 | End: 2025-08-07

## 2025-08-04 RX ORDER — NICOTINE POLACRILEX 4 MG/1
1 GUM, CHEWING ORAL DAILY
Refills: 0 | Status: DISCONTINUED | OUTPATIENT
Start: 2025-08-04 | End: 2025-08-07

## 2025-08-04 RX ORDER — TOPIRAMATE 25 MG/1
200 TABLET, FILM COATED ORAL EVERY 12 HOURS
Refills: 0 | Status: DISCONTINUED | OUTPATIENT
Start: 2025-08-04 | End: 2025-08-06

## 2025-08-04 RX ORDER — APIXABAN 5 MG/1
5 TABLET, FILM COATED ORAL EVERY 12 HOURS
Refills: 0 | Status: DISCONTINUED | OUTPATIENT
Start: 2025-08-04 | End: 2025-08-07

## 2025-08-04 RX ORDER — TOPIRAMATE 25 MG/1
300 TABLET, FILM COATED ORAL EVERY 12 HOURS
Refills: 0 | Status: DISCONTINUED | OUTPATIENT
Start: 2025-08-04 | End: 2025-08-04

## 2025-08-04 RX ADMIN — LAMOTRIGINE 100 MILLIGRAM(S): 150 TABLET ORAL at 21:23

## 2025-08-04 RX ADMIN — NICOTINE POLACRILEX 1 PATCH: 4 GUM, CHEWING ORAL at 15:10

## 2025-08-04 RX ADMIN — METOPROLOL SUCCINATE 100 MILLIGRAM(S): 50 TABLET, EXTENDED RELEASE ORAL at 21:22

## 2025-08-04 RX ADMIN — Medication 5 MILLIGRAM(S): at 21:22

## 2025-08-04 RX ADMIN — ATORVASTATIN CALCIUM 10 MILLIGRAM(S): 80 TABLET, FILM COATED ORAL at 21:23

## 2025-08-04 RX ADMIN — TOPIRAMATE 200 MILLIGRAM(S): 25 TABLET, FILM COATED ORAL at 21:23

## 2025-08-04 RX ADMIN — APIXABAN 5 MILLIGRAM(S): 5 TABLET, FILM COATED ORAL at 21:23

## 2025-08-05 PROCEDURE — 95720 EEG PHY/QHP EA INCR W/VEEG: CPT

## 2025-08-05 PROCEDURE — 99222 1ST HOSP IP/OBS MODERATE 55: CPT

## 2025-08-05 RX ADMIN — LAMOTRIGINE 100 MILLIGRAM(S): 150 TABLET ORAL at 21:38

## 2025-08-05 RX ADMIN — NICOTINE POLACRILEX 1 PATCH: 4 GUM, CHEWING ORAL at 07:30

## 2025-08-05 RX ADMIN — TOPIRAMATE 200 MILLIGRAM(S): 25 TABLET, FILM COATED ORAL at 10:04

## 2025-08-05 RX ADMIN — LAMOTRIGINE 100 MILLIGRAM(S): 150 TABLET ORAL at 10:04

## 2025-08-05 RX ADMIN — NICOTINE POLACRILEX 1 PATCH: 4 GUM, CHEWING ORAL at 15:56

## 2025-08-05 RX ADMIN — TOPIRAMATE 200 MILLIGRAM(S): 25 TABLET, FILM COATED ORAL at 21:38

## 2025-08-05 RX ADMIN — Medication 5 MILLIGRAM(S): at 21:38

## 2025-08-05 RX ADMIN — Medication 1 APPLICATION(S): at 18:18

## 2025-08-05 RX ADMIN — ATORVASTATIN CALCIUM 10 MILLIGRAM(S): 80 TABLET, FILM COATED ORAL at 21:38

## 2025-08-05 RX ADMIN — APIXABAN 5 MILLIGRAM(S): 5 TABLET, FILM COATED ORAL at 10:04

## 2025-08-05 RX ADMIN — METOPROLOL SUCCINATE 100 MILLIGRAM(S): 50 TABLET, EXTENDED RELEASE ORAL at 21:38

## 2025-08-05 RX ADMIN — APIXABAN 5 MILLIGRAM(S): 5 TABLET, FILM COATED ORAL at 21:38

## 2025-08-05 RX ADMIN — NICOTINE POLACRILEX 1 PATCH: 4 GUM, CHEWING ORAL at 11:59

## 2025-08-05 RX ADMIN — Medication 5 MILLIGRAM(S): at 10:19

## 2025-08-06 PROCEDURE — 95720 EEG PHY/QHP EA INCR W/VEEG: CPT

## 2025-08-06 RX ORDER — TOPIRAMATE 25 MG/1
100 TABLET, FILM COATED ORAL EVERY 12 HOURS
Refills: 0 | Status: DISCONTINUED | OUTPATIENT
Start: 2025-08-06 | End: 2025-08-07

## 2025-08-06 RX ADMIN — APIXABAN 5 MILLIGRAM(S): 5 TABLET, FILM COATED ORAL at 10:26

## 2025-08-06 RX ADMIN — LAMOTRIGINE 100 MILLIGRAM(S): 150 TABLET ORAL at 10:26

## 2025-08-06 RX ADMIN — LAMOTRIGINE 100 MILLIGRAM(S): 150 TABLET ORAL at 22:52

## 2025-08-06 RX ADMIN — Medication 5 MILLIGRAM(S): at 22:52

## 2025-08-06 RX ADMIN — TOPIRAMATE 100 MILLIGRAM(S): 25 TABLET, FILM COATED ORAL at 10:26

## 2025-08-06 RX ADMIN — NICOTINE POLACRILEX 1 PATCH: 4 GUM, CHEWING ORAL at 07:24

## 2025-08-06 RX ADMIN — ATORVASTATIN CALCIUM 10 MILLIGRAM(S): 80 TABLET, FILM COATED ORAL at 22:52

## 2025-08-06 RX ADMIN — APIXABAN 5 MILLIGRAM(S): 5 TABLET, FILM COATED ORAL at 22:52

## 2025-08-06 RX ADMIN — Medication 1 APPLICATION(S): at 11:06

## 2025-08-06 RX ADMIN — TOPIRAMATE 100 MILLIGRAM(S): 25 TABLET, FILM COATED ORAL at 22:52

## 2025-08-06 RX ADMIN — NICOTINE POLACRILEX 1 PATCH: 4 GUM, CHEWING ORAL at 11:06

## 2025-08-06 RX ADMIN — Medication 5 MILLIGRAM(S): at 10:26

## 2025-08-06 RX ADMIN — NICOTINE POLACRILEX 1 PATCH: 4 GUM, CHEWING ORAL at 11:31

## 2025-08-07 ENCOUNTER — TRANSCRIPTION ENCOUNTER (OUTPATIENT)
Age: 65
End: 2025-08-07

## 2025-08-07 VITALS
DIASTOLIC BLOOD PRESSURE: 62 MMHG | TEMPERATURE: 97 F | SYSTOLIC BLOOD PRESSURE: 126 MMHG | RESPIRATION RATE: 18 BRPM | OXYGEN SATURATION: 100 % | HEART RATE: 59 BPM

## 2025-08-07 LAB
LAMOTRIGINE SERPL-MCNC: 2.1 UG/ML — SIGNIFICANT CHANGE UP (ref 2–20)
TOPIRAMATE SERPL-MCNC: 14.4 MCG/ML — SIGNIFICANT CHANGE UP

## 2025-08-07 PROCEDURE — 95720 EEG PHY/QHP EA INCR W/VEEG: CPT

## 2025-08-07 RX ORDER — LAMOTRIGINE 150 MG/1
1 TABLET ORAL
Qty: 60 | Refills: 3
Start: 2025-08-07 | End: 2025-12-04

## 2025-08-07 RX ORDER — TOPIRAMATE 25 MG/1
1 TABLET, FILM COATED ORAL
Qty: 60 | Refills: 3
Start: 2025-08-07 | End: 2025-12-04

## 2025-08-07 RX ADMIN — Medication 1 APPLICATION(S): at 12:00

## 2025-08-07 RX ADMIN — Medication 5 MILLIGRAM(S): at 10:34

## 2025-08-07 RX ADMIN — LAMOTRIGINE 100 MILLIGRAM(S): 150 TABLET ORAL at 10:34

## 2025-08-07 RX ADMIN — APIXABAN 5 MILLIGRAM(S): 5 TABLET, FILM COATED ORAL at 10:35

## 2025-08-07 RX ADMIN — NICOTINE POLACRILEX 1 PATCH: 4 GUM, CHEWING ORAL at 07:15

## 2025-08-07 RX ADMIN — TOPIRAMATE 100 MILLIGRAM(S): 25 TABLET, FILM COATED ORAL at 10:34

## 2025-08-12 DIAGNOSIS — G40.909 EPILEPSY, UNSPECIFIED, NOT INTRACTABLE, WITHOUT STATUS EPILEPTICUS: ICD-10-CM

## 2025-08-12 DIAGNOSIS — G89.29 OTHER CHRONIC PAIN: ICD-10-CM

## 2025-08-12 DIAGNOSIS — I42.9 CARDIOMYOPATHY, UNSPECIFIED: ICD-10-CM

## 2025-08-12 DIAGNOSIS — I48.0 PAROXYSMAL ATRIAL FIBRILLATION: ICD-10-CM

## 2025-08-12 DIAGNOSIS — F17.210 NICOTINE DEPENDENCE, CIGARETTES, UNCOMPLICATED: ICD-10-CM

## 2025-08-12 DIAGNOSIS — G47.33 OBSTRUCTIVE SLEEP APNEA (ADULT) (PEDIATRIC): ICD-10-CM

## 2025-08-12 DIAGNOSIS — E66.9 OBESITY, UNSPECIFIED: ICD-10-CM

## 2025-08-12 DIAGNOSIS — M54.9 DORSALGIA, UNSPECIFIED: ICD-10-CM

## 2025-08-12 DIAGNOSIS — Z79.01 LONG TERM (CURRENT) USE OF ANTICOAGULANTS: ICD-10-CM

## 2025-08-12 DIAGNOSIS — I25.10 ATHEROSCLEROTIC HEART DISEASE OF NATIVE CORONARY ARTERY WITHOUT ANGINA PECTORIS: ICD-10-CM

## 2025-08-12 DIAGNOSIS — I10 ESSENTIAL (PRIMARY) HYPERTENSION: ICD-10-CM

## 2025-08-14 ENCOUNTER — NON-APPOINTMENT (OUTPATIENT)
Age: 65
End: 2025-08-14

## 2025-08-21 ENCOUNTER — NON-APPOINTMENT (OUTPATIENT)
Age: 65
End: 2025-08-21

## 2025-09-08 ENCOUNTER — APPOINTMENT (OUTPATIENT)
Dept: NEUROLOGY | Facility: CLINIC | Age: 65
End: 2025-09-08
Payer: MEDICARE

## 2025-09-08 VITALS
WEIGHT: 247 LBS | BODY MASS INDEX: 38.77 KG/M2 | SYSTOLIC BLOOD PRESSURE: 132 MMHG | HEART RATE: 74 BPM | DIASTOLIC BLOOD PRESSURE: 87 MMHG | HEIGHT: 67 IN

## 2025-09-08 DIAGNOSIS — G47.33 OBSTRUCTIVE SLEEP APNEA (ADULT) (PEDIATRIC): ICD-10-CM

## 2025-09-08 DIAGNOSIS — E66.9 OBESITY, UNSPECIFIED: ICD-10-CM

## 2025-09-08 DIAGNOSIS — R56.9 UNSPECIFIED CONVULSIONS: ICD-10-CM

## 2025-09-08 PROCEDURE — 99213 OFFICE O/P EST LOW 20 MIN: CPT

## 2025-09-10 ENCOUNTER — APPOINTMENT (OUTPATIENT)
Dept: CARDIOLOGY | Facility: CLINIC | Age: 65
End: 2025-09-10
Payer: MEDICARE

## 2025-09-10 DIAGNOSIS — E78.5 HYPERLIPIDEMIA, UNSPECIFIED: ICD-10-CM

## 2025-09-10 DIAGNOSIS — I48.91 UNSPECIFIED ATRIAL FIBRILLATION: ICD-10-CM

## 2025-09-10 DIAGNOSIS — I10 ESSENTIAL (PRIMARY) HYPERTENSION: ICD-10-CM

## 2025-09-10 DIAGNOSIS — I25.10 ATHEROSCLEROTIC HEART DISEASE OF NATIVE CORONARY ARTERY W/OUT ANGINA PECTORIS: ICD-10-CM

## 2025-09-10 PROCEDURE — 93306 TTE W/DOPPLER COMPLETE: CPT
